# Patient Record
Sex: FEMALE | Race: WHITE | NOT HISPANIC OR LATINO | Employment: STUDENT | URBAN - METROPOLITAN AREA
[De-identification: names, ages, dates, MRNs, and addresses within clinical notes are randomized per-mention and may not be internally consistent; named-entity substitution may affect disease eponyms.]

---

## 2020-11-11 ENCOUNTER — OFFICE VISIT (OUTPATIENT)
Dept: OBGYN | Facility: CLINIC | Age: 20
End: 2020-11-11
Payer: COMMERCIAL

## 2020-11-11 VITALS
BODY MASS INDEX: 30.73 KG/M2 | WEIGHT: 180 LBS | DIASTOLIC BLOOD PRESSURE: 87 MMHG | HEART RATE: 103 BPM | HEIGHT: 64 IN | SYSTOLIC BLOOD PRESSURE: 131 MMHG

## 2020-11-11 DIAGNOSIS — J45.20 MILD INTERMITTENT ASTHMA WITHOUT COMPLICATION: ICD-10-CM

## 2020-11-11 DIAGNOSIS — Z90.89 HISTORY OF TONSILLECTOMY: ICD-10-CM

## 2020-11-11 DIAGNOSIS — F31.9 BIPOLAR DEPRESSION (H): ICD-10-CM

## 2020-11-11 DIAGNOSIS — Z30.430 ENCOUNTER FOR IUD INSERTION: Primary | ICD-10-CM

## 2020-11-11 DIAGNOSIS — E78.00 HYPERCHOLESTEROLEMIA: ICD-10-CM

## 2020-11-11 PROBLEM — J45.909 ASTHMA: Status: ACTIVE | Noted: 2020-11-11

## 2020-11-11 LAB
HCG UR QL: NEGATIVE
INTERNAL QC OK POCT: YES

## 2020-11-11 PROCEDURE — 99207 PR SERVICE NOT STAFFED W/SUPERV PROV: CPT | Performed by: OBSTETRICS & GYNECOLOGY

## 2020-11-11 PROCEDURE — G0463 HOSPITAL OUTPT CLINIC VISIT: HCPCS

## 2020-11-11 PROCEDURE — 81025 URINE PREGNANCY TEST: CPT | Performed by: STUDENT IN AN ORGANIZED HEALTH CARE EDUCATION/TRAINING PROGRAM

## 2020-11-11 PROCEDURE — 250N000011 HC RX IP 250 OP 636: Performed by: STUDENT IN AN ORGANIZED HEALTH CARE EDUCATION/TRAINING PROGRAM

## 2020-11-11 PROCEDURE — 90686 IIV4 VACC NO PRSV 0.5 ML IM: CPT

## 2020-11-11 PROCEDURE — 58300 INSERT INTRAUTERINE DEVICE: CPT | Mod: GC | Performed by: OBSTETRICS & GYNECOLOGY

## 2020-11-11 PROCEDURE — G0008 ADMIN INFLUENZA VIRUS VAC: HCPCS

## 2020-11-11 PROCEDURE — 250N000011 HC RX IP 250 OP 636

## 2020-11-11 PROCEDURE — 58300 INSERT INTRAUTERINE DEVICE: CPT | Performed by: STUDENT IN AN ORGANIZED HEALTH CARE EDUCATION/TRAINING PROGRAM

## 2020-11-11 RX ORDER — LURASIDONE HYDROCHLORIDE 40 MG/1
TABLET, FILM COATED ORAL
COMMUNITY
End: 2022-03-30

## 2020-11-11 RX ORDER — DEXTROAMPHETAMINE SACCHARATE, AMPHETAMINE ASPARTATE, DEXTROAMPHETAMINE SULFATE AND AMPHETAMINE SULFATE 7.5; 7.5; 7.5; 7.5 MG/1; MG/1; MG/1; MG/1
30 TABLET ORAL 2 TIMES DAILY
COMMUNITY
End: 2022-03-30

## 2020-11-11 RX ORDER — DIPHENHYDRAMINE HCL 25 MG
25 CAPSULE ORAL EVERY 6 HOURS PRN
COMMUNITY
End: 2021-11-03

## 2020-11-11 RX ORDER — DEXTROAMPHETAMINE SACCHARATE, AMPHETAMINE ASPARTATE, DEXTROAMPHETAMINE SULFATE AND AMPHETAMINE SULFATE 5; 5; 5; 5 MG/1; MG/1; MG/1; MG/1
20 TABLET ORAL 2 TIMES DAILY
COMMUNITY
End: 2022-03-30

## 2020-11-11 RX ORDER — CETIRIZINE HYDROCHLORIDE 10 MG/1
10 TABLET ORAL DAILY
COMMUNITY
End: 2021-11-03

## 2020-11-11 RX ORDER — LORATADINE 10 MG/1
10 TABLET ORAL DAILY
COMMUNITY
End: 2023-03-21

## 2020-11-11 RX ORDER — LANOLIN ALCOHOL/MO/W.PET/CERES
1 CREAM (GRAM) TOPICAL
COMMUNITY
End: 2023-03-21

## 2020-11-11 RX ORDER — LAMOTRIGINE 100 MG/1
100 TABLET ORAL DAILY
COMMUNITY

## 2020-11-11 RX ORDER — FLUTICASONE PROPIONATE 50 MCG
1 SPRAY, SUSPENSION (ML) NASAL DAILY
COMMUNITY
End: 2023-03-21

## 2020-11-11 RX ORDER — LEVONORGESTREL/ETHIN.ESTRADIOL 0.1-0.02MG
1 TABLET ORAL DAILY
COMMUNITY
End: 2021-11-03

## 2020-11-11 RX ORDER — MONTELUKAST SODIUM 10 MG/1
10 TABLET ORAL AT BEDTIME
COMMUNITY
End: 2023-03-21

## 2020-11-11 RX ORDER — CHLORAL HYDRATE 500 MG
2 CAPSULE ORAL DAILY
COMMUNITY
End: 2022-03-30

## 2020-11-11 RX ORDER — FAMOTIDINE 20 MG
TABLET ORAL
COMMUNITY
End: 2022-03-30

## 2020-11-11 RX ADMIN — LEVONORGESTREL 20 MCG: 52 INTRAUTERINE DEVICE INTRAUTERINE at 15:28

## 2020-11-11 SDOH — HEALTH STABILITY: MENTAL HEALTH: HOW OFTEN DO YOU HAVE 6 OR MORE DRINKS ON ONE OCCASION?: NEVER

## 2020-11-11 SDOH — HEALTH STABILITY: MENTAL HEALTH: HOW MANY STANDARD DRINKS CONTAINING ALCOHOL DO YOU HAVE ON A TYPICAL DAY?: NOT ASKED

## 2020-11-11 SDOH — HEALTH STABILITY: MENTAL HEALTH: HOW OFTEN DO YOU HAVE A DRINK CONTAINING ALCOHOL?: NEVER

## 2020-11-11 ASSESSMENT — MIFFLIN-ST. JEOR: SCORE: 1576.47

## 2020-11-11 ASSESSMENT — PAIN SCALES - GENERAL: PAINLEVEL: NO PAIN (0)

## 2020-11-11 NOTE — NURSING NOTE
Chief Complaint   Patient presents with     Establish Care     Fibroid consult   Rosemary Goodman LPN

## 2020-11-11 NOTE — PROGRESS NOTES
"[facility]  New Gynecology Visit      HPI:    Arabella Lawson is a 19 year old G0, here for evaluation of dysmenorrhea. She had pelvic US done in [place], per patient. She brings the report of the imaging, but not the actual images.     Patient reports always having heavy periods since menarche. She states she uses 4-5 overnight pads each day. Her periods are also associated with significant pain. She also reports irregular periods, sometimes q16d, sometimes q60d. Her mom and her sister also report having heavy periods. She denies family history of blood disorder or personal history of easy bruising or clots. She has tried three different OCPs with improvement in bleeding, but only mild improvement in pelvic pain.     Patient provides the written US report, which indicates that she has \"multiple follicles\" in bilateral ovaries. Uterus and ovaries are otherwise normal in size.       Patient would like to try a Mirena IUD. Discussed risks of the procedure including uterine perforation and failure of IUD.     GYN History  - LMP: No LMP recorded. (Menstrual status: Birth Control).  - Menses: Irregular, 16-60+ days. Uses 4-5 overnight pads  - Pap Smears: No pap smear history  - Contraception: has been on OCPs    OBHx  OB History   No obstetric history on file.       No past medical history on file.    No past surgical history on file.    Current Outpatient Medications   Medication     amphetamine-dextroamphetamine (ADDERALL) 20 MG tablet     amphetamine-dextroamphetamine (ADDERALL) 30 MG tablet     cetirizine (ZYRTEC) 10 MG tablet     diphenhydrAMINE (BENADRYL) 25 MG capsule     fish oil-omega-3 fatty acids 1000 MG capsule     fluticasone (FLONASE) 50 MCG/ACT nasal spray     lamoTRIgine (LAMICTAL) 100 MG tablet     levonorgestrel-ethinyl estradiol (AVIANE) 0.1-20 MG-MCG tablet     loratadine (CLARITIN) 10 MG tablet     lurasidone (LATUDA) 40 MG TABS tablet     melatonin 3 MG tablet     montelukast (SINGULAIR) 10 " "MG tablet     Vitamin D, Cholecalciferol, 25 MCG (1000 UT) CAPS     No current facility-administered medications for this visit.         No Known Allergies      Social History     Tobacco Use     Smoking status: Never Smoker     Smokeless tobacco: Never Used   Substance Use Topics     Alcohol use: Never     Frequency: Never     Binge frequency: Never     Drug use: Never         No family history on file.      ROS: 10-Point ROS negative except as noted in HPI    Physical Exam  /87   Pulse 103   Ht 1.626 m (5' 4\")   Wt 81.6 kg (180 lb)   Breastfeeding No   BMI 30.90 kg/m    Gen: Well-appearing, NAD  HEENT: Normocephalic, atraumatic  Neck: Thyroid is not enlarged, no appreciable masses palpated. Non-tender  CV:  RRR, no m/r/g auscultated  Pulm: CTAB, no w/r/r auscultated  Abd: Soft, non-tender, non-distended  Ext: No LE edema, extremities warm and well perfused    Pelvic:  Normal appearing external female genitalia. Normal hair distribution. Vagina is without lesions. discharge. Cervix no lesions, no cervical motion tenderness. Uterus is small, mobile, non-tender. No adnexal tenderness or masses    Assessment/Plan:  Arabella Lawson is a 19 year old G0 here for evaluation of dysmenorrhea.    Dysmenorrhea  - Inserted Mirena IUD  - Counseled to treat pain with periods with ibuprofen/tylenol  - Patient is able to check strings herself, no need for follow up  - Instructed to come back if she has problems or if the IUD does not help her symptoms      Return to clinic next year or as needed for problems.     Staffed with Dr. Quan.     El Mireles MD  Obstetrics, Gynecology, and Women's Health  PGY1  November 11, 2020 , 5:13 AM      The Patient was seen in [facility] by EL MIRELES.  I reviewed the history & exam. Assessment and plan were jointly made.  I was present for procedure.     Audelia Quan MD        Time Out - \"Pause for the Cause\"  Just before the procedure begins, " through verbal and active participation of team members, verify:    Initials   Patient Name    AJ   Patient Date of Birth AJ   Procedure to be performed  AJ   Site, laterality, level or multiples, noting patient position   AJ   Relevant images or diagnostics available/displayed   AJ   Implants, special equipment or special requirements        AJ     Consent:  Risks, benefits of treatment, and no treatment were discussed.  Patient's questions were elicited and answered. , Written consent signed and scanned into medical record. and Patient received and verbalized understanding of discharge instructions    PROCEDURE:  After informed consent was obtained from the patient, a speculum was placed in the vagina to visualized the cervix.  The cervix was then swabbed with a betadine prep and Xylocaine jelly applied.  Tenaculum was placed at the 12 o'clock position on the cervix and the uterus sounded to 7 cm.  The Mirena  IUD was then placed in the usual fashion under sterile technique.  Strings were clipped about 2 cm from the cervical os.  Tenaculum was removed and cervix was hemostatic. There were no complications. The patient tolerated the procedure well.  The patient should feel for the IUD strings after her next menses.  If unable to locate them, she should return to clinic for a speculum examination for confirmation that the IUD is in place. Bleeding pattern of this particular IUD was discussed with the patient. She is aware that the IUD will need to be removed in 5 years or PRN.  She is to return to clinic for her next annual or PRN.      El Ott MD

## 2021-02-24 ENCOUNTER — OFFICE VISIT (OUTPATIENT)
Dept: OPHTHALMOLOGY | Facility: CLINIC | Age: 21
End: 2021-02-24
Attending: OPHTHALMOLOGY
Payer: COMMERCIAL

## 2021-02-24 DIAGNOSIS — H04.123 DRY EYES, BILATERAL: ICD-10-CM

## 2021-02-24 DIAGNOSIS — H53.2 MONOCULAR DIPLOPIA OF BOTH EYES: Primary | ICD-10-CM

## 2021-02-24 DIAGNOSIS — H01.02B SQUAMOUS BLEPHARITIS OF UPPER AND LOWER EYELIDS OF BOTH EYES: ICD-10-CM

## 2021-02-24 DIAGNOSIS — H01.02A SQUAMOUS BLEPHARITIS OF UPPER AND LOWER EYELIDS OF BOTH EYES: ICD-10-CM

## 2021-02-24 PROCEDURE — G0463 HOSPITAL OUTPT CLINIC VISIT: HCPCS

## 2021-02-24 PROCEDURE — 99203 OFFICE O/P NEW LOW 30 MIN: CPT | Mod: GC | Performed by: OPHTHALMOLOGY

## 2021-02-24 ASSESSMENT — CONF VISUAL FIELD
OD_NORMAL: 1
OS_NORMAL: 1
METHOD: COUNTING FINGERS

## 2021-02-24 ASSESSMENT — EXTERNAL EXAM - RIGHT EYE: OD_EXAM: NORMAL

## 2021-02-24 ASSESSMENT — VISUAL ACUITY
OS_CC+: -1
OD_CC+: -1
OS_CC: 20/20
CORRECTION_TYPE: GLASSES
OD_CC: 20/20
METHOD: SNELLEN - LINEAR

## 2021-02-24 ASSESSMENT — CUP TO DISC RATIO
OS_RATIO: 0.3
OD_RATIO: 0.3

## 2021-02-24 ASSESSMENT — REFRACTION_WEARINGRX
OS_CYLINDER: +1.00
OD_CYLINDER: +0.50
OS_SPHERE: -1.00
SPECS_TYPE: SVL
OS_AXIS: 099
OD_AXIS: 070
OD_SPHERE: -0.75

## 2021-02-24 ASSESSMENT — TONOMETRY
IOP_METHOD: ICARE
OD_IOP_MMHG: 18
OS_IOP_MMHG: 18

## 2021-02-24 ASSESSMENT — SLIT LAMP EXAM - LIDS
COMMENTS: MGD
COMMENTS: MGD

## 2021-02-24 ASSESSMENT — EXTERNAL EXAM - LEFT EYE: OS_EXAM: NORMAL

## 2021-02-24 NOTE — PATIENT INSTRUCTIONS
Blepharitis    What is blepharitis?  Blepharitis is a common problem and although it does not result in blindness, it can contribute to red, irritated eyes. In the most common form of blepharitis, the eyelids are reddened and somewhat swollen and scaly appearing at the base of the lashes. As the scales become more coarse, the surface of the eye becomes irritated, forming crusts, which may cause the eyelids to stick together upon waking up in the morning. If the debris falls into your eye, you may feel like you have  something in your eye  or experience a gritty sensation.     Treating blepharitis  Ophthalmologists at the [facility] recommend controlling the problem through eyelid hygiene.   Eyelid hygiene is cleansing of the lid to promote a normal ocular surface. It is important to cleanse so that it does not develop into a more serious condition. Blepharitis cannot be cured, but with eyelid hygiene done on a daily basis, the symptoms may be controlled.     Cleansing the eye  Hot Compresses: perform 2 times daily, or as ordered by your doctor      A.  Soak clean washcloth in hot water  OR      B.  Commercially available hot pack  OR      C.  Dry rice in a clean sock (dress sock or other thin sock is best), microwave until hot (20-30 seconds)    - Place hot compress on closed eyelid for 3-5 minutes (make sure not too hot)   - Gently massage eyelids for 30 seconds    Eyelid Scrubs:   a. Make solution with   water +   baby shampoo OR  b. In shower: place baby shampoo on fingertips OR  c. Steri-Lid cleansing solution    -  Use solution of choice on fingertips to  brush  your eyelids (like brushing teeth) for 30 seconds  -  Rinse eyelids and eyelashes with clean water  - Wipe dry with clean, dry cloth (juanita cloth is best)      Additional care for blepharitis  Keep your hands and face clean. Be careful not to touch or rub your eyes with soiled handkerchiefs, dirty fingers, etc. Women should avoid the use of eye  makeup during the early stages of treatment. When cosmetic use is resumed, replace liquid products because your old products may be contaminated. Remove all eye makeup before bedtime.   Occasionally, medication may be prescribed to treat blepharitis, but only your doctor can decide if you are a candidate for this treatment.

## 2021-02-24 NOTE — NURSING NOTE
Chief Complaints and History of Present Illnesses   Patient presents with     Diplopia Evaluation     Chief Complaint(s) and History of Present Illness(es)     Diplopia Evaluation     Laterality: both eyes    Quality: vertical    Timing: when tired    Course: gradually worsening    Associated symptoms: blurred vision.  Negative for eye pain and headaches    Pain scale: 0/10              Comments     Pt complains of having constant vertical diplopia that does not go away when she covers an eye since last summer.  Feels it is continuing to get worse and is more noticeable when she is tired.  Pt wears glasses & soft CTL BE - wears CTL roughly 4-8 hours a day & does not sleep in them.  Denies any headaches or eye pain  Ocular meds: AT's PRN BE    Sun Sterling OT 2:09 PM February 24, 2021

## 2021-02-24 NOTE — PROGRESS NOTES
HPI:  Arabella Lawson is a 20 year old female here for evaluation of doubling and shadowing of her vision. She had noted difficulty focusing since last summer, but just noticed the doubling for the last couple of weeks. The images are vertical, and it is more of a shadowing that true double, and it is persistent in each eye if she closes the other eye. It is present whether she wears her glasses or her contact lenses. She notes intermittent dry feeling of both eyes. She has used artificial tears occasionally.     POH: Refractive error with glasses and soft contact lens wear. No history of eye surgery or trauma.  PMH: Asthma, HL, bipolar depression  FH: Glasses wear in most family members, no history of AMD or glc    Assessment & Plan     (H53.2) Monocular diplopia of both eyes  (primary encounter diagnosis)  (H04.123) Dry eyes, bilateral  (H01.02A,  H01.02B) Squamous blepharitis of upper and lower eyelids of both eyes  Comment: Dry eye/surface is most likely cause of her bilateral monocular diplopia/shadowing.   Plan: Reviewed possible etiologies of monocular diplopia.   Start warm compresses both eyes BID, Try ATs both eyes 3-4 x daily (Systane or Refresh)  Discussed that there are many options to treat eye dryness, so she will let me know if symptoms persist despite these measures, and we can reassess.      -----------------------------------------------------------------------------------    Patient disposition:   Return if symptoms worsen or fail to improve.    Kim Sagastume MD  Ophthalmology Resident, PGY-2    Teaching statement:  Complete documentation of historical and exam elements from today's encounter can be found in the full encounter summary report (not reduplicated in this progress note). I personally obtained the chief complaint(s) and history of present illness.  I confirmed and edited as necessary the review of systems, past medical/surgical history, family history, social history, and  examination findings as documented by others; and I examined the patient myself. I personally reviewed the relevant tests, images, and reports as documented above.     I formulated and edited as necessary the assessment and plan and discussed the findings and management plan with the patient and family.    Casandra Ward MD  Comprehensive Ophthalmology & Ocular Pathology  Department of Ophthalmology and Visual Neurosciences  [email or web address]  Pager [phone]

## 2021-03-07 ENCOUNTER — HEALTH MAINTENANCE LETTER (OUTPATIENT)
Age: 21
End: 2021-03-07

## 2021-04-21 ENCOUNTER — OFFICE VISIT (OUTPATIENT)
Dept: OPHTHALMOLOGY | Facility: CLINIC | Age: 21
End: 2021-04-21
Attending: OPHTHALMOLOGY
Payer: COMMERCIAL

## 2021-04-21 DIAGNOSIS — Z03.89 ENCOUNTER FOR OBSERVATION FOR OTHER SUSPECTED DISEASES AND CONDITIONS RULED OUT: ICD-10-CM

## 2021-04-21 DIAGNOSIS — H53.2 MONOCULAR DIPLOPIA OF BOTH EYES: Primary | ICD-10-CM

## 2021-04-21 DIAGNOSIS — H50.52 EXOPHORIA: ICD-10-CM

## 2021-04-21 DIAGNOSIS — H04.123 DRY EYES, BILATERAL: ICD-10-CM

## 2021-04-21 PROCEDURE — 92060 SENSORIMOTOR EXAMINATION: CPT

## 2021-04-21 PROCEDURE — 92060 SENSORIMOTOR EXAMINATION: CPT | Performed by: OPHTHALMOLOGY

## 2021-04-21 PROCEDURE — G0463 HOSPITAL OUTPT CLINIC VISIT: HCPCS | Mod: 25

## 2021-04-21 PROCEDURE — 99213 OFFICE O/P EST LOW 20 MIN: CPT | Mod: GC | Performed by: OPHTHALMOLOGY

## 2021-04-21 PROCEDURE — 92134 CPTRZ OPH DX IMG PST SGM RTA: CPT | Performed by: OPHTHALMOLOGY

## 2021-04-21 ASSESSMENT — EXTERNAL EXAM - RIGHT EYE: OD_EXAM: NORMAL

## 2021-04-21 ASSESSMENT — VISUAL ACUITY
METHOD: SNELLEN - LINEAR
OD_CC: 20/20
CORRECTION_TYPE: GLASSES
OS_CC: 20/20

## 2021-04-21 ASSESSMENT — REFRACTION_MANIFEST
OS_AXIS: 099
OS_SPHERE: -1.25
OS_CYLINDER: +1.00
OD_SPHERE: -0.50
OD_AXIS: 070
OD_CYLINDER: +0.50

## 2021-04-21 ASSESSMENT — REFRACTION_WEARINGRX
OS_AXIS: 099
SPECS_TYPE: SVL
OS_SPHERE: -1.00
OS_CYLINDER: +1.00
OD_SPHERE: -0.75
OD_CYLINDER: +0.50
OD_AXIS: 070

## 2021-04-21 ASSESSMENT — EXTERNAL EXAM - LEFT EYE: OS_EXAM: NORMAL

## 2021-04-21 ASSESSMENT — SLIT LAMP EXAM - LIDS
COMMENTS: MGD
COMMENTS: MGD

## 2021-04-21 NOTE — PROGRESS NOTES
HPI:  Arabella Lawson is a 20 year old female here for 2 month f/u eval of diplopia. Has monoocular diplopia of both eyes, but it is significantly improved with closing one eye. Diplopia is noted to be vertical with down gaze and horizontal with left and right gaze. There is some on primary gaze but more mild.    Pt first noticed blurry of vision over the summer so she got glasses, then noted double vision afterwards. It is has progressed but has been constant since. This is her first time wearing glasses or contacts. No hx of misalignment as a child. No hx of misalignment or amblyopia in the family. No hx of trauma.     Has been using WCs BID and ATs 3-4x a time without any change in sx.    Has a hx of chronic tension headaches. Had an MRI two years ago which was unremarkable. Does not feel like the frequency or severity of headaches have changed. NO nausea, vomiting, numbness, or weakness.     States that she has intermittent truncal ataxia for the past year since starting her psychiatric medication. Otherwise, no other neurologic symptoms. Pt otherwise healthy. Half-brother has a hx of brain cancer.    POH: Refractive error with glasses and soft contact lens wear. No history of eye surgery or trauma.  PMH: Asthma, HL, bipolar depression  FH: Glasses wear in most family members, no history of AMD or glc    On my alignment assessment:  Pt has a R hypertropia, pronounced on downgaze, and worse with left head tilt. Appears equivocal in right and left gaze. Close to ortho at primary position.     Assessment & Plan      (H53.2) Monocular diplopia of both eyes  (primary encounter diagnosis)  Comment: Bilateral monocular diplopia.  Not improved with lubrication.  Pentacam with mild regular astigmatism of both eyes, no evidence of KCN.  OCT MAC was unremarkable without ERM.  Small RH and exophoria, but has persistent bilateral monocular diplopia even with prism.    Still most likely cause of monocular diplopia in a  young healthy patient is ocular surface.    Plan:  Recommend she continue lubrication.   Refer to contact lens clinic for trial of RGP to see if it will improve her monoocular diplopia.  If still unresolved, can consider referral to neuro-ophthalmology for further evaluation of the small RH.    (H04.123) Dry eyes, bilateral  Comment: Improved ocular surface today  Plan: Cont warm compresses both eyes BID, ATs both eyes 3-4 x daily (Systane or Refresh)     -----------------------------------------------------------------------------------    Patient disposition:   Return for contact lens clinic next available, or sooner as needed.    Alondra Benedict MD  PGY-2 Resident Physician  Department of Ophthalmology      Teaching statement:  Complete documentation of historical and exam elements from today's encounter can be found in the full encounter summary report (not reduplicated in this progress note). I personally obtained the chief complaint(s) and history of present illness.  I confirmed and edited as necessary the review of systems, past medical/surgical history, family history, social history, and examination findings as documented by others; and I examined the patient myself. I personally reviewed the relevant tests, images, and reports as documented above.     I formulated and edited as necessary the assessment and plan and discussed the findings and management plan with the patient and family.    Casandra Ward MD  Comprehensive Ophthalmology & Ocular Pathology  Department of Ophthalmology and Visual Neurosciences  [email or web address]  Pager [phone]

## 2021-04-21 NOTE — NURSING NOTE
Chief Complaints and History of Present Illnesses   Patient presents with     Follow Up      Chief Complaint(s) and History of Present Illness(es)     Follow Up     Laterality: both eyes    Associated symptoms: double vision    Treatments tried: artificial tears    Response to treatment: no improvement              Comments     Follow up of monocular diplopia both eyes.  Diplopia the same each eye.  No change in vision.  Tried AT's and warm compress,no improvement in double vision.  Eye meds: AT's and warm compress   JAZMYN Zambrano 4/21/2021 9:49 AM

## 2021-04-29 ENCOUNTER — TELEPHONE (OUTPATIENT)
Dept: OPHTHALMOLOGY | Facility: CLINIC | Age: 21
End: 2021-04-29

## 2021-04-29 NOTE — TELEPHONE ENCOUNTER
Spoke with patient regarding a scheduling error and asked if patient could come in at noon for appointment same day. Patient was agreeable and rescheduled for noon same day. Sent appointment letter to Patient-Per Patient

## 2021-05-05 ENCOUNTER — OFFICE VISIT (OUTPATIENT)
Dept: OPHTHALMOLOGY | Facility: CLINIC | Age: 21
End: 2021-05-05
Payer: COMMERCIAL

## 2021-05-05 DIAGNOSIS — H53.2 MONOCULAR DIPLOPIA OF BOTH EYES: Primary | ICD-10-CM

## 2021-05-05 DIAGNOSIS — H04.123 DRY EYES, BILATERAL: ICD-10-CM

## 2021-05-05 DIAGNOSIS — H52.213 IRREGULAR ASTIGMATISM OF BOTH EYES: ICD-10-CM

## 2021-05-05 PROCEDURE — 99213 OFFICE O/P EST LOW 20 MIN: CPT | Performed by: OPTOMETRIST

## 2021-05-05 PROCEDURE — 92025 CPTRIZED CORNEAL TOPOGRAPHY: CPT | Performed by: OPTOMETRIST

## 2021-05-05 ASSESSMENT — KERATOMETRY
METHOD_AUTO_MANUAL: AUTOMATED
OD_K2POWER_DIOPTERS: 43.25
OS_K2POWER_DIOPTERS: 44.50
OD_AXISANGLE_DEGREES: 078
OD_AXISANGLE2_DEGREES: 168
OS_AXISANGLE2_DEGREES: 001
OS_K1POWER_DIOPTERS: 43.00
OS_AXISANGLE_DEGREES: 091
OD_K1POWER_DIOPTERS: 42.75

## 2021-05-05 ASSESSMENT — REFRACTION_CURRENTRX
OD_DIAMETER: 9.6
OS_SPHERE: -3.00
OS_BASECURVE: 7.80
OS_BRAND: OXYFLOW
OS_DIAMETER: 9.6
OD_BASECURVE: 7.90
OD_SPHERE: -3.00
OD_BRAND: OXYFLOW

## 2021-05-05 ASSESSMENT — CONF VISUAL FIELD
METHOD: COUNTING FINGERS
OS_NORMAL: 1
OD_NORMAL: 1

## 2021-05-05 ASSESSMENT — TONOMETRY
OD_IOP_MMHG: 18
OS_IOP_MMHG: 17
IOP_METHOD: ICARE

## 2021-05-05 ASSESSMENT — REFRACTION_MANIFEST
OD_SPHERE: -0.50
OD_CYLINDER: +0.50
OS_AXIS: 099
OD_SPHERE: -1.00
OD_AXIS: 070
OD_CYLINDER: +0.50
METHOD_AUTOREFRACTION: 1
OD_AXIS: 057
OS_CYLINDER: +1.50
OS_SPHERE: -1.00
OS_AXIS: 096
OS_SPHERE: -1.25
OS_CYLINDER: +1.00

## 2021-05-05 ASSESSMENT — VISUAL ACUITY
OS_CC: 20/20
VA_OR_OS_CURRENT_RX: 20/20-1
OD_CC: 20/20
CORRECTION_TYPE: GLASSES
METHOD: SNELLEN - LINEAR
VA_OR_OD_CURRENT_RX: 20/20

## 2021-05-05 ASSESSMENT — EXTERNAL EXAM - LEFT EYE: OS_EXAM: NORMAL

## 2021-05-05 ASSESSMENT — REFRACTION_WEARINGRX
OD_AXIS: 070
SPECS_TYPE: SVL
OS_AXIS: 099
OD_CYLINDER: +0.50
OS_CYLINDER: +1.00
OS_SPHERE: -1.00
OD_SPHERE: -0.75

## 2021-05-05 ASSESSMENT — SLIT LAMP EXAM - LIDS
COMMENTS: TR MGD
COMMENTS: TR MGD

## 2021-05-05 ASSESSMENT — EXTERNAL EXAM - RIGHT EYE: OD_EXAM: NORMAL

## 2021-05-05 NOTE — PROGRESS NOTES
History  HPI     Contact Lens Evaluation     In both eyes.  This started years ago.  Occurring constantly.  Since onset it is stable.  Associated symptoms include double vision.  Treatments tried include artificial tears.  Pain was noted as 0/10.              Comments     Arabella Lawson is being seen today at the request of Casandra Ward for contact lens for Monocular diplopia. Pt states continues to have double vision all the time, it doesn't go away with closing an eye and the double seems present in both eyes. Systane drops prn ou. Pt has worn soft contacts in the past.    Citlali Paz, COT COT 12:07 PM May 5, 2021     Complains that symptoms are constant and have been present for ~12 months. Got first pair of glasses following onset of symptoms, but they don't help with monocular diplopia. She wears them full-time.          Last edited by Thad Kramer, OD on 5/5/2021 12:26 PM. (History)          Assessment/Plan  (H53.2) Monocular diplopia of both eyes  (primary encounter diagnosis)  Comment: Constant monocular diplopia with and without habitual glasses correction.    Topography consistent with regular astigmatism both eyes    No improvement in symptoms with dry eye treatments  Plan:  Educated patient on clinical findings. The patient noted marked improvement with her symptoms while wearing a corneal gas permeable lens but complaints of blur upon blinking and her symptom of blur is more bothersome than her monocular diplopia. Discussed possible scleral lens fitting to help reduce lens movement while still correcting for monocular diplopia.     (H04.123) Dry eyes, bilateral  Comment: tr MGD, inferior SPK right eye, symptomatic.  Plan:  Recommended continue use of artificial tears and warm compresses. Future scleral lens fitting will also address symptoms of dry eye.     Return to clinic in 2-3 weeks for scleral lens fitting and I&R    Leticia Pascual, Optometry Student    I have reviewed and agree with the  above plan as written.    Teaching Statement:    Complete documentation of historical and exam elements from today's encounter can  be found in the full encounter summary report (not reduplicated in this progress  note). I personally obtained the chief complaint(s) and history of present illness. I  confirmed and edited as necessary the review of systems, past medical/surgical  history, family history, social history, and examination findings as documented by  others; and I examined the patient myself. I personally reviewed the relevant tests,  images, and reports as documented above. I formulated and edited as necessary the  assessment and plan and discussed the findings and management plan with the  patient and family.    Thad Kramer OD, FAAO

## 2021-05-05 NOTE — Clinical Note
Thanks for sending her. Her subjective symptoms seem to be minimal, but she does note an improvement with RGP lenses, but the lens movement is more bothersome than the diplopia. I am seeing her back for a scleral fit to reduce lens movement.  Thanks,  Julius

## 2021-05-05 NOTE — NURSING NOTE
Chief Complaints and History of Present Illnesses   Patient presents with     Contact Lens Evaluation     Chief Complaint(s) and History of Present Illness(es)     Contact Lens Evaluation     Laterality: both eyes    Onset: years ago    Frequency: constantly    Course: stable    Associated symptoms: double vision    Treatments tried: artificial tears    Pain scale: 0/10              Comments     Arabella YAO Sauerge is being seen today at the request of Casandra Ward for contact lens for Monocular diplopia. Pt states continues to have double vision all the time, it doesn't go away with closing an eye and the double seems present in both eyes. Systane drops prn ou. Pt has worn soft contacts in the past.    MARGOT Loera COT 12:07 PM May 5, 2021

## 2021-05-25 ENCOUNTER — OFFICE VISIT (OUTPATIENT)
Dept: OPHTHALMOLOGY | Facility: CLINIC | Age: 21
End: 2021-05-25
Payer: COMMERCIAL

## 2021-05-25 DIAGNOSIS — H53.2 MONOCULAR DIPLOPIA OF BOTH EYES: Primary | ICD-10-CM

## 2021-05-25 PROCEDURE — 99213 OFFICE O/P EST LOW 20 MIN: CPT | Performed by: OPTOMETRIST

## 2021-05-25 PROCEDURE — V2599 CONTACT LENS/ES OTHER TYPE: HCPCS | Performed by: OPTOMETRIST

## 2021-05-25 ASSESSMENT — VISUAL ACUITY
OS_CC: 20/20-2
OD_CC: 20/20
METHOD: SNELLEN - LINEAR

## 2021-05-25 ASSESSMENT — REFRACTION_CURRENTRX
OS_BRAND: ONEFIT
OS_DIAMETER: 14.9
OD_SPHERE: -2.00
OS_SPHERE: -2.50
OD_BRAND: ONEFIT
OD_DIAMETER: 14.9
OS_BASECURVE: 7.8
OD_BASECURVE: 7.9

## 2021-05-25 ASSESSMENT — REFRACTION_MANIFEST
OD_CYLINDER: SPHERE
OD_SPHERE: -0.25
OS_SPHERE: +0.25
OS_CYLINDER: SPHERE

## 2021-05-25 ASSESSMENT — EXTERNAL EXAM - LEFT EYE: OS_EXAM: NORMAL

## 2021-05-25 ASSESSMENT — EXTERNAL EXAM - RIGHT EYE: OD_EXAM: NORMAL

## 2021-05-25 ASSESSMENT — CONF VISUAL FIELD
METHOD: COUNTING FINGERS
OS_NORMAL: 1

## 2021-05-25 ASSESSMENT — TONOMETRY
IOP_METHOD: ICARE
OS_IOP_MMHG: 19
OD_IOP_MMHG: 19

## 2021-05-25 NOTE — PROGRESS NOTES
History  HPI     Patient is here for a contact lens exam. Patient has previous history of GP contact lens wear. Patient wore GPs to eliminate monocular diplopia. Excessive movement with GPs but clear single vision. Patient here today for scleral lens fitting. Currently wearing soft contact lenses and glasses. Experiencing diplopia through these corrections but clear vision. No changes in vision since previous visit.     Last edited by Edvin Hobson on 5/25/2021 12:55 PM. (History)          Assessment/Plan  (H53.2) Monocular diplopia of both eyes  (primary encounter diagnosis)  Comment: Significant improvement in monocular diplopia symptoms with scleral lenses, small over-refraction and small increase in vault needed  Plan:  Educated patient on clinical findings. Scleral lenses ordered for treatment of monocular diplopia (medically necessary). Lenses ordered based on fitting. Successful insertion/removal training at this visit. When lenses arrive, patient to return for scleral lens follow-up.    Contact Lens Billing  V-Code:  - CL, other; scleral lens     # of units: 2   Price per Unit: $250  Total Billed: $500    This patient requires contact lenses that are medically necessary for either improvement in vision over spectacles, support of the ocular surface, or other therapeutic benefit. These are not cosmetic contact lenses.     Encounter Diagnosis   Name Primary?     Monocular diplopia of both eyes Yes      Complete documentation of historical and exam elements from today's encounter can  be found in the full encounter summary report (not reduplicated in this progress  note). I personally obtained the chief complaint(s) and history of present illness. I  confirmed and edited as necessary the review of systems, past medical/surgical  history, family history, social history, and examination findings as documented by  others; and I examined the patient myself. I personally reviewed the relevant tests,  images,  and reports as documented above. I formulated and edited as necessary the  assessment and plan and discussed the findings and management plan with the  patient and family.    Thad Kramer OD, FAAO

## 2021-06-02 ENCOUNTER — TELEPHONE (OUTPATIENT)
Dept: OPHTHALMOLOGY | Facility: CLINIC | Age: 21
End: 2021-06-02

## 2021-06-02 NOTE — TELEPHONE ENCOUNTER
"Patient returned VM regarding scheduling a (40min) \"Scleral rebeca dispense visit, lenses received 6/2/PV\" with . Scheduled patient accordingly and sent appointment letter to confirmed address. Per Patient   "

## 2021-06-16 ENCOUNTER — OFFICE VISIT (OUTPATIENT)
Dept: OPHTHALMOLOGY | Facility: CLINIC | Age: 21
End: 2021-06-16
Payer: COMMERCIAL

## 2021-06-16 DIAGNOSIS — H53.2 MONOCULAR DIPLOPIA OF BOTH EYES: Primary | ICD-10-CM

## 2021-06-16 PROCEDURE — 99213 OFFICE O/P EST LOW 20 MIN: CPT | Performed by: OPTOMETRIST

## 2021-06-16 RX ORDER — SODIUM CHLORIDE FOR INHALATION 0.9 %
VIAL, NEBULIZER (ML) INHALATION
Qty: 5 ML | Refills: 11 | Status: SHIPPED | OUTPATIENT
Start: 2021-06-16 | End: 2022-10-21

## 2021-06-16 ASSESSMENT — TONOMETRY
OS_IOP_MMHG: 21
OD_IOP_MMHG: 21
IOP_METHOD: ICARE

## 2021-06-16 ASSESSMENT — REFRACTION_WEARINGRX
OD_SPHERE: -0.75
OS_SPHERE: -1.00
OD_AXIS: 070
OD_CYLINDER: +0.50
SPECS_TYPE: SVL
OS_AXIS: 099
OS_CYLINDER: +1.00

## 2021-06-16 ASSESSMENT — REFRACTION_CURRENTRX
OD_SPHERE: -1.25
OS_BRAND: ONEFIT
OD_BRAND: ONEFIT
OS_DIAMETER: 15.2
OS_BASECURVE: 8.0
OD_BASECURVE: 8.1
OS_SPHERE: -1.12
OD_DIAMETER: 15.2

## 2021-06-16 ASSESSMENT — CONF VISUAL FIELD
METHOD: COUNTING FINGERS
OD_NORMAL: 1
OS_NORMAL: 1

## 2021-06-16 ASSESSMENT — REFRACTION_MANIFEST
OS_SPHERE: PLANO
OD_SPHERE: PLANO
OS_CYLINDER: SPHERE
OD_CYLINDER: SPHERE

## 2021-06-16 ASSESSMENT — VISUAL ACUITY
OS_CC: 20/20
OS_CC+: -1
CORRECTION_TYPE: GLASSES
METHOD: SNELLEN - LINEAR
OD_CC: 20/20

## 2021-06-16 ASSESSMENT — EXTERNAL EXAM - RIGHT EYE: OD_EXAM: NORMAL

## 2021-06-16 ASSESSMENT — EXTERNAL EXAM - LEFT EYE: OS_EXAM: NORMAL

## 2021-06-16 NOTE — PROGRESS NOTES
History  HPI     Contact Lens Follow Up     In both eyes.  Charactertized as  distorted vision.  Occurring constantly.  Pain was noted as 0/10.              Comments     The patient presents for scleral lens dispense visit. No other complaints.          Last edited by Thad Kramer, OD on 6/16/2021  3:15 PM. (History)          Assessment/Plan  (H53.2) Monocular diplopia of both eyes  (primary encounter diagnosis)  Comment: Excellent fit and vision, patient states that diplopia is completely resolved, states that vision in contact lenses is far better than that in glasses  Plan: sodium chloride 0.9 % neb solution         Educated patient on clinical findings. Dispensed lenses for full-time wear (recommended slowly increasing daily wear time from 2- 4 hours up to 12). Prescribed non-preserved saline as filling solution. Return to clinic in 1 month for follow-up.     Return to clinic in 1 month for scleral lens follow-up.    Complete documentation of historical and exam elements from today's encounter can  be found in the full encounter summary report (not reduplicated in this progress  note). I personally obtained the chief complaint(s) and history of present illness. I  confirmed and edited as necessary the review of systems, past medical/surgical  history, family history, social history, and examination findings as documented by  others; and I examined the patient myself. I personally reviewed the relevant tests,  images, and reports as documented above. I formulated and edited as necessary the  assessment and plan and discussed the findings and management plan with the  patient and family.    Thad Kramer, OD, [facility]

## 2021-07-08 ENCOUNTER — OFFICE VISIT (OUTPATIENT)
Dept: OPHTHALMOLOGY | Facility: CLINIC | Age: 21
End: 2021-07-08
Payer: COMMERCIAL

## 2021-07-08 ENCOUNTER — MYC MEDICAL ADVICE (OUTPATIENT)
Dept: OPHTHALMOLOGY | Facility: CLINIC | Age: 21
End: 2021-07-08

## 2021-07-08 DIAGNOSIS — H53.2 MONOCULAR DIPLOPIA OF BOTH EYES: Primary | ICD-10-CM

## 2021-07-08 PROCEDURE — 99213 OFFICE O/P EST LOW 20 MIN: CPT | Performed by: OPTOMETRIST

## 2021-07-08 ASSESSMENT — VISUAL ACUITY
METHOD: SNELLEN - LINEAR
CORRECTION_TYPE: CONTACTS
OS_CC: 20/20
OS_CC+: -1
OD_CC: 20/20

## 2021-07-08 ASSESSMENT — CONF VISUAL FIELD
OD_NORMAL: 1
METHOD: COUNTING FINGERS
OS_NORMAL: 1

## 2021-07-08 ASSESSMENT — REFRACTION_CURRENTRX
OS_DIAMETER: 15.2
OD_SPHERE: -1.25
OD_DIAMETER: 15.2
OD_BRAND: ONEFIT
OS_BRAND: ONEFIT
OD_DIAMETER: 15.2
OD_BRAND: ONEFIT
OS_SPHERE: -1.62
OS_BASECURVE: 7.9
OD_SPHERE: -1.75
OS_DIAMETER: 15.2
OS_BRAND: ONEFIT
OS_SPHERE: -1.12
OD_BASECURVE: 8.1
OS_BASECURVE: 8.0
OD_BASECURVE: 8.0

## 2021-07-08 ASSESSMENT — EXTERNAL EXAM - LEFT EYE: OS_EXAM: NORMAL

## 2021-07-08 ASSESSMENT — TONOMETRY: IOP_UNABLETOASSESS: 1

## 2021-07-08 ASSESSMENT — REFRACTION_MANIFEST
OD_SPHERE: PLANO
OS_SPHERE: PLANO

## 2021-07-08 ASSESSMENT — EXTERNAL EXAM - RIGHT EYE: OD_EXAM: NORMAL

## 2021-07-08 NOTE — NURSING NOTE
Chief Complaints and History of Present Illnesses   Patient presents with     Contact Lens Follow Up     3 week f/u Scleral contact lens     Chief Complaint(s) and History of Present Illness(es)     Contact Lens Follow Up     Laterality: both eyes    Onset: 3 weeks ago    Associated symptoms: Negative for eye pain    Pain scale: 0/10    Comments: 3 week f/u Scleral contact lens              Comments     Pt notes she is seeing well, fit comfortably, no additional concerns.     Mirna Card COT July 8, 2021 12:54 PM

## 2021-07-20 ENCOUNTER — TELEPHONE (OUTPATIENT)
Dept: OPHTHALMOLOGY | Facility: CLINIC | Age: 21
End: 2021-07-20

## 2021-07-20 NOTE — TELEPHONE ENCOUNTER
Spoke with patient regarding Lens are being mailed out to [place] address and Dr. Kramer requested she follow-up after a month of wearing CTL. Schedule patient accordingly when patient returns to [place]. Patient will see appointment in [facility].-Per Patient

## 2021-07-20 NOTE — TELEPHONE ENCOUNTER
CTL received; sent to address provided by pt via [employer].  Will send message to Allegra to have pt f/u in a month after trying out CTL and receiving.

## 2021-08-23 ENCOUNTER — TELEPHONE (OUTPATIENT)
Dept: OPHTHALMOLOGY | Facility: CLINIC | Age: 21
End: 2021-08-23

## 2021-08-23 NOTE — TELEPHONE ENCOUNTER
Spoke with patient regarding sooner appointment available from the wait-list. Scheduled patient and will confirm with provider if appointment will run longer than 60 minutes.-Per Patient

## 2021-08-24 ENCOUNTER — TELEPHONE (OUTPATIENT)
Dept: OPHTHALMOLOGY | Facility: CLINIC | Age: 21
End: 2021-08-24

## 2021-08-24 NOTE — TELEPHONE ENCOUNTER
Spoke with patient confimring scheduled appointment with  should be no longer than 60 minutes. Patient will make sure she has enough time to get to class and will cancel one of the appointments after making sure of drive time-Per Patient

## 2021-09-02 ENCOUNTER — TELEPHONE (OUTPATIENT)
Dept: OPHTHALMOLOGY | Facility: CLINIC | Age: 21
End: 2021-09-02

## 2021-09-02 NOTE — TELEPHONE ENCOUNTER
Spoke with patient regarding confirming September appointment would work for patient after her looking into her drive time. Yes, 9/16/21 appointment works for patient. Also keeping November appointment in place if needed after being seen in September. Patient has my number if any scheduling conflicts arise.-Per Patient

## 2021-09-10 ENCOUNTER — TELEPHONE (OUTPATIENT)
Dept: OPHTHALMOLOGY | Facility: CLINIC | Age: 21
End: 2021-09-10

## 2021-09-10 NOTE — TELEPHONE ENCOUNTER
Spoke with patient s mother regarding cancelled appointment 9/16/2021 as Provider is out of Clinic. Patient's mother will let patient know of cancelled appointment.. -Per Patient s Mother

## 2021-09-10 NOTE — TELEPHONE ENCOUNTER
LVM for patient regarding appointment on 9/16/2021 needs rescheduled due to Provider is out of clinic. Provided [facility] number for rescheduling needs.

## 2021-09-13 ENCOUNTER — TELEPHONE (OUTPATIENT)
Dept: OPHTHALMOLOGY | Facility: CLINIC | Age: 21
End: 2021-09-13

## 2021-09-13 NOTE — TELEPHONE ENCOUNTER
Patient called and LVM that current November appointment will be fine for next appointment for a follow up with . Sent reminder letter to patient.-Per Patient voice message

## 2021-10-11 ENCOUNTER — HEALTH MAINTENANCE LETTER (OUTPATIENT)
Age: 21
End: 2021-10-11

## 2021-11-03 ENCOUNTER — OFFICE VISIT (OUTPATIENT)
Dept: OPHTHALMOLOGY | Facility: CLINIC | Age: 21
End: 2021-11-03
Payer: COMMERCIAL

## 2021-11-03 DIAGNOSIS — H53.2 MONOCULAR DIPLOPIA OF BOTH EYES: Primary | ICD-10-CM

## 2021-11-03 DIAGNOSIS — H52.213 IRREGULAR ASTIGMATISM OF BOTH EYES: ICD-10-CM

## 2021-11-03 PROCEDURE — 99213 OFFICE O/P EST LOW 20 MIN: CPT | Performed by: OPTOMETRIST

## 2021-11-03 ASSESSMENT — VISUAL ACUITY
OS_CC: 20/20
CORRECTION_TYPE: CONTACTS
METHOD: SNELLEN - LINEAR
OD_CC: 20/20

## 2021-11-03 ASSESSMENT — CONF VISUAL FIELD
OD_NORMAL: 1
METHOD: COUNTING FINGERS
OS_NORMAL: 1

## 2021-11-03 ASSESSMENT — TONOMETRY: IOP_UNABLETOASSESS: 1

## 2021-11-03 ASSESSMENT — EXTERNAL EXAM - RIGHT EYE: OD_EXAM: NORMAL

## 2021-11-03 ASSESSMENT — EXTERNAL EXAM - LEFT EYE: OS_EXAM: NORMAL

## 2021-11-03 NOTE — PROGRESS NOTES
History  HPI     Contact Lens Follow Up     In both eyes.  Associated symptoms include Negative for eye pain and dryness.  Pain was noted as 0/10.              Comments     Pt notes that there is occ fogging of vision in the CTL, she states that she can take out and clean it and it will improve and other times it will not, she feels that the OUTSIDE of the lens gets dry, but notes that the contacts are comfortable otherwise.     Mirna Card COT November 3, 2021 2:39 PM            Last edited by Cari Card on 11/3/2021  2:39 PM. (History)          Assessment/Plan  (H53.2) Monocular diplopia of both eyes  (primary encounter diagnosis)  Comment: Resolved with scleral lens wear, noting some midday fogging of lenses  Plan:  Educated patient on clinical findings. Central vault is appropriate and patient has good comfort in lenses. Consider increased limbal clearance with a future lens due to negative staining, though the patient denies discomfort. Regarding mid-day fogging, consider continuing with removal and reinsertion of lenses as needed. May also consider using celluvisc as a filling solution. Monitor at comprehensive eye exam in 6 months.    Addendum 11/15/21: The patient contacted the clinic noting that one of her lenses had broken. Given mild limbal staining, I reordered the lenses with larger diameter, in an effort to increase diameter. As I was away on paternity leave and unavailable for follow-up, [facility] is offering a one-time extension of the 90 policy, with a new warranty for both lenses starting today. New lenses with larger diameter ordered. Patient to be call for dispense when lenses arrive. Reference: [id number]    (H52.213) Irregular astigmatism of both eyes  Comment: See above    Return to clinic in 6 months for comprehensive eye exam with scleral lens refit.    Complete documentation of historical and exam elements from today's encounter can  be found in the full encounter summary report (not  reduplicated in this progress  note). I personally obtained the chief complaint(s) and history of present illness. I  confirmed and edited as necessary the review of systems, past medical/surgical  history, family history, social history, and examination findings as documented by  others; and I examined the patient myself. I personally reviewed the relevant tests,  images, and reports as documented above. I formulated and edited as necessary the  assessment and plan and discussed the findings and management plan with the  patient and family.    Thad Kramer OD, FAAO

## 2021-11-03 NOTE — NURSING NOTE
Chief Complaints and History of Present Illnesses   Patient presents with     Contact Lens Follow Up     Chief Complaint(s) and History of Present Illness(es)     Contact Lens Follow Up     Laterality: both eyes    Associated symptoms: Negative for eye pain and dryness    Pain scale: 0/10              Comments     Pt notes that there is occ fogging of vision in the CTL, she states that she can take out and clean it and it will improve and other times it will not, she feels that the OUTSIDE of the lens gets dry, but notes that the contacts are comfortable otherwise.     Mirna Card COT November 3, 2021 2:39 PM

## 2021-11-11 ENCOUNTER — MYC MEDICAL ADVICE (OUTPATIENT)
Dept: OPHTHALMOLOGY | Facility: CLINIC | Age: 21
End: 2021-11-11
Payer: COMMERCIAL

## 2021-11-15 ASSESSMENT — REFRACTION_CURRENTRX
OD_BASECURVE: 8.3
OD_BASECURVE: 8.0
OD_BRAND: ONEFIT
OS_BASECURVE: 8.2
OS_BRAND: ONEFIT
OS_DIAMETER: 15.5
OS_BASECURVE: 7.9
OD_BRAND: ONEFIT
OD_SPHERE: -0.25
OD_DIAMETER: 15.2
OS_SPHERE: -1.62
OS_DIAMETER: 15.2
OD_DIAMETER: 15.5
OS_SPHERE: PLANO
OD_SPHERE: -1.75
OS_BRAND: ONEFIT

## 2022-01-20 ENCOUNTER — OFFICE VISIT (OUTPATIENT)
Dept: OPHTHALMOLOGY | Facility: CLINIC | Age: 22
End: 2022-01-20
Payer: COMMERCIAL

## 2022-01-20 DIAGNOSIS — H53.2 MONOCULAR DIPLOPIA OF BOTH EYES: Primary | ICD-10-CM

## 2022-01-20 DIAGNOSIS — H52.213 IRREGULAR ASTIGMATISM OF BOTH EYES: ICD-10-CM

## 2022-01-20 PROCEDURE — 99213 OFFICE O/P EST LOW 20 MIN: CPT | Performed by: OPTOMETRIST

## 2022-01-20 ASSESSMENT — VISUAL ACUITY
OS_CC: 20/20
OD_CC: 20/20
METHOD: SNELLEN - LINEAR
VA_OR_OD_CURRENT_RX: 20/20
CORRECTION_TYPE: CONTACTS
VA_OR_OS_CURRENT_RX: 20/20

## 2022-01-20 ASSESSMENT — REFRACTION_CURRENTRX
OS_BASECURVE: 8.2
OS_SPHERE: PLANO
OD_DIAMETER: 15.5
OS_BRAND: ONEFIT
OD_SPHERE: -0.25
OD_BRAND: ONEFIT
OS_DIAMETER: 15.5
OD_BASECURVE: 8.3

## 2022-01-20 ASSESSMENT — EXTERNAL EXAM - LEFT EYE: OS_EXAM: NORMAL

## 2022-01-20 ASSESSMENT — CONF VISUAL FIELD
OS_NORMAL: 1
OD_NORMAL: 1

## 2022-01-20 ASSESSMENT — EXTERNAL EXAM - RIGHT EYE: OD_EXAM: NORMAL

## 2022-01-20 NOTE — PROGRESS NOTES
History  HPI     Contact Lens Follow Up     In both eyes.  Onset was gradual.  This started months ago.  Response to treatment was significant improvement.  Pain was noted as 0/10.              Comments     Patient here for scleral lens follow up each eye. Has been wearing them for 5 hrs today. Uses [employer] for filling and [employer] simplus for cleaning. No trouble with I&R. No irritation or redness with current lenses. Wears them every day for 8-12hrs. Some foggy vision after 6hrs of wearing and goes away after reinserting them, 50% of the time.             Last edited by Blair Connolly on 1/20/2022  2:42 PM. (History)          Assessment/Plan  (H53.2) Monocular diplopia of both eyes  (primary encounter diagnosis)  (H52.213) Irregular astigmatism of both eyes  Comment: Good vision and fit in new lenses, patient reporting good comfort and no clouding; low vault after several hours of wear but no corneal touch and no punctate limbal staining (irregular staining pattern may be secondary to removal)  Plan:  Educated patient on clinical findings. Given excellent comfort and vision, as well as resolution of diplopia and mid-day clouding, no change indicated in lenses at this time (low vault centrally but no corneal touch). Return to clinic in 6-8 weeks for scleral lens follow-up. If staining or discomfort are noted, consider switching design.    Complete documentation of historical and exam elements from today's encounter can  be found in the full encounter summary report (not reduplicated in this progress  note). I personally obtained the chief complaint(s) and history of present illness. I  confirmed and edited as necessary the review of systems, past medical/surgical  history, family history, social history, and examination findings as documented by  others; and I examined the patient myself. I personally reviewed the relevant tests,  images, and reports as documented above. I formulated and edited as necessary  the  assessment and plan and discussed the findings and management plan with the  patient and family.    Thad Kramer OD, FAAO

## 2022-01-25 ENCOUNTER — TELEPHONE (OUTPATIENT)
Dept: OPHTHALMOLOGY | Facility: CLINIC | Age: 22
End: 2022-01-25
Payer: COMMERCIAL

## 2022-01-25 NOTE — TELEPHONE ENCOUNTER
Spoke with patient regarding scheduling a follow-up on a Thursday in 6-8 weeks (early-March) with . Scheduled patient accordingly and sent appointment letter to confirmed address.-Per Patient

## 2022-03-27 ENCOUNTER — HEALTH MAINTENANCE LETTER (OUTPATIENT)
Age: 22
End: 2022-03-27

## 2022-03-30 ENCOUNTER — OFFICE VISIT (OUTPATIENT)
Dept: OPHTHALMOLOGY | Facility: CLINIC | Age: 22
End: 2022-03-30
Payer: COMMERCIAL

## 2022-03-30 DIAGNOSIS — H52.213 IRREGULAR ASTIGMATISM OF BOTH EYES: Primary | ICD-10-CM

## 2022-03-30 DIAGNOSIS — H53.2 MONOCULAR DIPLOPIA OF BOTH EYES: ICD-10-CM

## 2022-03-30 PROCEDURE — 99213 OFFICE O/P EST LOW 20 MIN: CPT | Performed by: OPTOMETRIST

## 2022-03-30 RX ORDER — DEXTROAMPHETAMINE SULFATE, DEXTROAMPHETAMINE SACCHARATE, AMPHETAMINE SULFATE AND AMPHETAMINE ASPARTATE 7.5; 7.5; 7.5; 7.5 MG/1; MG/1; MG/1; MG/1
CAPSULE, EXTENDED RELEASE ORAL
COMMUNITY
Start: 2021-10-25 | End: 2023-03-21

## 2022-03-30 RX ORDER — DEXTROAMPHETAMINE SULFATE, DEXTROAMPHETAMINE SACCHARATE, AMPHETAMINE SULFATE AND AMPHETAMINE ASPARTATE 5; 5; 5; 5 MG/1; MG/1; MG/1; MG/1
CAPSULE, EXTENDED RELEASE ORAL
COMMUNITY
Start: 2021-04-23 | End: 2023-03-21

## 2022-03-30 RX ORDER — LURASIDONE HYDROCHLORIDE 20 MG/1
TABLET, FILM COATED ORAL
COMMUNITY
Start: 2021-10-27

## 2022-03-30 ASSESSMENT — REFRACTION_CURRENTRX
OS_SPHERE: PLANO
OS_BASECURVE: 8.2
OS_DIAMETER: 15.5
OD_BRAND: ONEFIT
OD_SPHERE: -0.25
OD_BASECURVE: 8.3
OD_DIAMETER: 15.5
OS_BRAND: ONEFIT

## 2022-03-30 ASSESSMENT — VISUAL ACUITY
CORRECTION_TYPE: CONTACTS
METHOD: SNELLEN - LINEAR
OD_CC: 20/15
OS_CC: 20/20
OD_CC+: -2

## 2022-03-30 ASSESSMENT — EXTERNAL EXAM - LEFT EYE: OS_EXAM: NORMAL

## 2022-03-30 ASSESSMENT — CONF VISUAL FIELD
OD_NORMAL: 1
OS_NORMAL: 1

## 2022-03-30 ASSESSMENT — EXTERNAL EXAM - RIGHT EYE: OD_EXAM: NORMAL

## 2022-03-30 NOTE — PROGRESS NOTES
History  HPI     Contact Lens Follow Up     In both eyes.              Comments     Pt here today for CL follow up.  Pt states current lenses aren't as comfortable but pt would like to continue.  Still experiencing constant double vision but may be caused by a current medication.  Will being seeing primary about medication on Friday.    Ocular meds = none    Gueroeric JANG, MARLO 12:35 PM 03/30/2022             Last edited by Thad Kramer, OD on 3/30/2022 12:47 PM. (History)          Assessment/Plan  (H52.213) Irregular astigmatism of both eyes  (primary encounter diagnosis)  (H53.2) Monocular diplopia of both eyes  Comment: Low central vault, low limbal clearance, discomfort in current lenses, residual monocular diplopia left eye with lens, 13mm HVID  Plan:  Educated patient on clinical findings. Given the tight limbal fit in conjunction with large HVID, a larger lens is warranted. There is no larger lens available in this design, new lens design indicated. Referred to Dr. Correa for scleral lens refit.    Complete documentation of historical and exam elements from today's encounter can  be found in the full encounter summary report (not reduplicated in this progress  note). I personally obtained the chief complaint(s) and history of present illness. I  confirmed and edited as necessary the review of systems, past medical/surgical  history, family history, social history, and examination findings as documented by  others; and I examined the patient myself. I personally reviewed the relevant tests,  images, and reports as documented above. I formulated and edited as necessary the  assessment and plan and discussed the findings and management plan with the  patient and family.    Thad Kramer, OD, FAAO

## 2022-04-22 ENCOUNTER — OFFICE VISIT (OUTPATIENT)
Dept: OPHTHALMOLOGY | Facility: CLINIC | Age: 22
End: 2022-04-22
Payer: COMMERCIAL

## 2022-04-22 DIAGNOSIS — H52.213 IRREGULAR ASTIGMATISM OF BOTH EYES: ICD-10-CM

## 2022-04-22 DIAGNOSIS — H53.2 MONOCULAR DIPLOPIA OF BOTH EYES: Primary | ICD-10-CM

## 2022-04-22 PROCEDURE — 99213 OFFICE O/P EST LOW 20 MIN: CPT | Performed by: OPTOMETRIST

## 2022-04-22 ASSESSMENT — VISUAL ACUITY
OD_CC: 20/15
OS_CC: 20/20
METHOD: SNELLEN - LINEAR
OS_CC+: -2
OD_CC+: -1
CORRECTION_TYPE: CONTACTS

## 2022-04-22 ASSESSMENT — REFRACTION_CURRENTRX
OS_SPHERE: -2.00
OD_BRAND: ONEFIT
OD_DIAMETER: 17.0
OS_BRAND: ONEFIT
OS_SPHERE: PLANO
OS_BASECURVE: 8.2
OD_SPHERE: -0.25
OD_DIAMETER: 15.5
OS_DIAMETER: 15.5
OS_DIAMETER: 17.0
OD_BASECURVE: 8.3
OD_SPHERE: -2.00

## 2022-04-22 ASSESSMENT — SLIT LAMP EXAM - LIDS
COMMENTS: NORMAL
COMMENTS: NORMAL

## 2022-04-22 ASSESSMENT — CONF VISUAL FIELD
METHOD: COUNTING FINGERS
OD_NORMAL: 1
OS_NORMAL: 1

## 2022-04-22 ASSESSMENT — EXTERNAL EXAM - RIGHT EYE: OD_EXAM: NORMAL

## 2022-04-22 ASSESSMENT — EXTERNAL EXAM - LEFT EYE: OS_EXAM: NORMAL

## 2022-04-22 NOTE — NURSING NOTE
Chief Complaints and History of Present Illnesses   Patient presents with     New Eval For Contact Lens     Chief Complaint(s) and History of Present Illness(es)     New Eval For Contact Lens     Laterality: both eyes    Associated symptoms: Negative for glare, haloes, flashes and floaters    Treatments tried: no treatments    Pain scale: 0/10              Comments     Patient states discomfort in both lenses. Patient states she still sees double in left eye when lenses are in (sees double with both when lenses are out). Patient present for new scleral fitting today.     Ally Bahena, SUHAIL April 22, 2022 10:09 AM

## 2022-04-22 NOTE — PROGRESS NOTES
A/P  1.) Monocular diplopia with irregular astigmatism OU  -Previously in scleral lenses with mostly resolved diplopia. Still symptomatic for monocular diplopia when lenses are out  -Current lenses uncomfortable. Diam too small causing limbal touch 360. Large HVID, needs larger diam lenses  -Much improved comfort/fit with refit to 17.0 Zenlens design. No sphero-cyl OR, suggesting any future toric OR would be caused by lens flexure  -Continue [place] Simplus and NaCl vials to fill    Order new pair and mail direct. F/u 1-2 months for CL recheck, sooner prn    I have confirmed the patient's CC, HPI and reviewed Past Medical History, Past Surgical History, Social History, Family History, Problem List, Medication List and agree with Tech note.     Anai Correa OD FAAO FSLS

## 2022-07-08 ENCOUNTER — OFFICE VISIT (OUTPATIENT)
Dept: OPHTHALMOLOGY | Facility: CLINIC | Age: 22
End: 2022-07-08
Payer: COMMERCIAL

## 2022-07-08 DIAGNOSIS — H53.2 MONOCULAR DIPLOPIA OF BOTH EYES: Primary | ICD-10-CM

## 2022-07-08 DIAGNOSIS — H52.213 IRREGULAR ASTIGMATISM OF BOTH EYES: ICD-10-CM

## 2022-07-08 DIAGNOSIS — H04.123 DRY EYES, BILATERAL: ICD-10-CM

## 2022-07-08 PROCEDURE — 99213 OFFICE O/P EST LOW 20 MIN: CPT | Performed by: OPTOMETRIST

## 2022-07-08 ASSESSMENT — REFRACTION_CURRENTRX
OD_DIAMETER: 17.0
OS_DIAMETER: 17.0
OS_BRAND: ZENLENS PROLATE
OD_BRAND: ZENLENS PROLATE
OD_SPHERE: -2.75
OS_ADDL_SPECS: BOSTON XO BLUE HYDRAPEG
OD_ADDL_SPECS: BOSTON XO CLEAR HYDRAPEG
OS_SPHERE: -2.25

## 2022-07-08 ASSESSMENT — EXTERNAL EXAM - RIGHT EYE: OD_EXAM: NORMAL

## 2022-07-08 ASSESSMENT — CONF VISUAL FIELD
METHOD: COUNTING FINGERS
OS_NORMAL: 1
OD_NORMAL: 1

## 2022-07-08 ASSESSMENT — EXTERNAL EXAM - LEFT EYE: OS_EXAM: NORMAL

## 2022-07-08 ASSESSMENT — VISUAL ACUITY
OD_CC: 20/15
OS_CC+: -1
METHOD: SNELLEN - LINEAR
OD_CC+: -1
CORRECTION_TYPE: CONTACTS
OS_CC: 20/15

## 2022-07-08 ASSESSMENT — SLIT LAMP EXAM - LIDS
COMMENTS: NORMAL
COMMENTS: NORMAL

## 2022-07-08 NOTE — NURSING NOTE
Chief Complaints and History of Present Illnesses   Patient presents with     Follow Up     Chief Complaint(s) and History of Present Illness(es)     Follow Up     Laterality: both eyes              Comments     Patient states she has not been wearing lenses due to other issues (had wisdom teeth removed). Patient wore lenses 3x in the last week. Patient states comfort is good. Patient states vision is decent but has diplopia left eye. No other issues at this time.     Ally Bahena, SUHAIL July 8, 2022 10:06 AM

## 2022-07-08 NOTE — PROGRESS NOTES
A/P  1.) Monocular diplopia with irregular astigmatism OU  -Previously in scleral lenses with mostly resolved diplopia. Still symptomatic for monocular diplopia when lenses are out  -Much improved fit with larger diam lenses. Limbal stain resolved. Slightly tight fit vertical merid only  -Excellent vision OU, left eye does prefer small toric OR. Will include    Order new pair and mail direct. F/u prn with any lens concerns    I have confirmed the patient's CC, HPI and reviewed Past Medical History, Past Surgical History, Social History, Family History, Problem List, Medication List and agree with Tech note.     Anai Correa, RAMIREZ FAAO FSLS

## 2022-07-15 ENCOUNTER — OFFICE VISIT (OUTPATIENT)
Dept: FAMILY MEDICINE | Facility: CLINIC | Age: 22
End: 2022-07-15
Payer: COMMERCIAL

## 2022-07-15 VITALS
BODY MASS INDEX: 24.87 KG/M2 | DIASTOLIC BLOOD PRESSURE: 64 MMHG | TEMPERATURE: 99.2 F | SYSTOLIC BLOOD PRESSURE: 102 MMHG | WEIGHT: 144.9 LBS | OXYGEN SATURATION: 99 % | HEART RATE: 114 BPM

## 2022-07-15 DIAGNOSIS — T88.7XXA MEDICATION SIDE EFFECTS: ICD-10-CM

## 2022-07-15 DIAGNOSIS — R55 VASOVAGAL SYNCOPE: ICD-10-CM

## 2022-07-15 DIAGNOSIS — R00.0 TACHYCARDIA: Primary | ICD-10-CM

## 2022-07-15 DIAGNOSIS — N95.1 MENOPAUSAL SYNDROME (HOT FLASHES): ICD-10-CM

## 2022-07-15 PROBLEM — K08.409 WISDOM TEETH REMOVED: Status: ACTIVE | Noted: 2022-05-27

## 2022-07-15 LAB
ESTRADIOL SERPL-MCNC: 34 PG/ML
FSH SERPL IRP2-ACNC: 8.8 MIU/ML
LH SERPL-ACNC: 12 MIU/ML
MAGNESIUM SERPL-MCNC: 2.5 MG/DL (ref 1.7–2.3)
TSH SERPL DL<=0.005 MIU/L-ACNC: 1.98 UIU/ML (ref 0.3–4.2)

## 2022-07-15 PROCEDURE — 83001 ASSAY OF GONADOTROPIN (FSH): CPT | Performed by: STUDENT IN AN ORGANIZED HEALTH CARE EDUCATION/TRAINING PROGRAM

## 2022-07-15 PROCEDURE — 36415 COLL VENOUS BLD VENIPUNCTURE: CPT | Performed by: STUDENT IN AN ORGANIZED HEALTH CARE EDUCATION/TRAINING PROGRAM

## 2022-07-15 PROCEDURE — 83735 ASSAY OF MAGNESIUM: CPT | Performed by: STUDENT IN AN ORGANIZED HEALTH CARE EDUCATION/TRAINING PROGRAM

## 2022-07-15 PROCEDURE — 99204 OFFICE O/P NEW MOD 45 MIN: CPT | Performed by: STUDENT IN AN ORGANIZED HEALTH CARE EDUCATION/TRAINING PROGRAM

## 2022-07-15 PROCEDURE — 83002 ASSAY OF GONADOTROPIN (LH): CPT | Performed by: STUDENT IN AN ORGANIZED HEALTH CARE EDUCATION/TRAINING PROGRAM

## 2022-07-15 PROCEDURE — 82670 ASSAY OF TOTAL ESTRADIOL: CPT | Performed by: STUDENT IN AN ORGANIZED HEALTH CARE EDUCATION/TRAINING PROGRAM

## 2022-07-15 PROCEDURE — 84443 ASSAY THYROID STIM HORMONE: CPT | Performed by: STUDENT IN AN ORGANIZED HEALTH CARE EDUCATION/TRAINING PROGRAM

## 2022-07-15 RX ORDER — LAMOTRIGINE 50 MG/1
TABLET, ORALLY DISINTEGRATING ORAL
COMMUNITY
Start: 2022-04-09 | End: 2023-03-21

## 2022-07-15 ASSESSMENT — PAIN SCALES - GENERAL: PAINLEVEL: NO PAIN (0)

## 2022-07-15 NOTE — PROGRESS NOTES
Assessment and Plan     21-year-old female with past medical history of bipolar depression who presents due to a constellation of symptoms including sinus tachycardia shown to be sinus on an EKG done through psychiatry recently, dizziness, brain fog, fine motor issues, hot flashes.  Chronic in nature.  Her psychiatric nurse practitioner recommended she follow-up with primary care for provider about the symptoms.  Patient has had a CBC recently that was normal.  No concerning symptoms for anemia.  Thus we will not obtain a CBC but will obtain a TSH and magnesium further evaluate tachycardia.  Also recommended a Holter monitor and an echocardiogram.  If normal would consider referral to cardiology but more likely monitor.  For hot flash type symptoms I recommended checking some basic hormones as below.  Her dizziness I think is most consistent with vasovagal syncope by description.  Likely exacerbated by her sinus tachycardia.  We discussed drinking significant on a water and behavioral techniques to help control this but I did not recommend additional work-up.  I do think some of her symptoms could be related to medication side effects such as brain fog.  Also her Adderall is very likely contributing to her tachycardia.    1. Tachycardia  - Adult Cardiac Event Monitor; Future  - Echocardiogram Complete; Future  - Magnesium; Future  - TSH with free T4 reflex; Future    2. Menopausal syndrome (hot flashes)  - Estradiol; Future  - Luteinizing Hormone, Adult; Future  - Follicle stimulating hormone; Future    3. Vasovagal syncope    4. Medication side effects      Follow up: pending lab workup  Options for treatment and follow-up care were reviewed with the patient and/or guardian. Arabella Lawson and/or guardian engaged in the decision making process and verbalized understanding of the options discussed and agreed with the final plan.    Dr. Leo Villarreal         HPI:   Arabella Lawson is a 21 year old  female who  presents for:    Chief Complaint   Patient presents with     History of Present Illness     Dizziness, fast heart rate, brain fog, fine motor issues, hot flashes, etc. Thought was due to a medication or side effect of med but still having issues with a change in dosage of meds.      Patient tlels me she has ha d number of symptoms chronically and thought this was related to her meds. Her psychiatric nurse practitioner stated she should see a PCP. She has had dizziness, hot flashes, nausea, tachycardia, brain fog. She does feel a shaking of her hands with fine motor movements.     Tachycardia:  Has a Hx of heavy periods. Has an IUD now and hasn't had a period since. Had a hemoglobin at end of June and 13.5. EKG at that time showed sinus. She has occasional SOB, palpitations. No chest pain.     Dizziness:  Mostly with standing up. Will go away for a little bit. Feels like she may faint. Has tunneling type sensation. Never has actually fainted.      Answers for HPI/ROS submitted by the patient on 7/15/2022  How many servings of fruits and vegetables do you eat daily?: 0-1  On average, how many sweetened beverages do you drink each day (Examples: soda, juice, sweet tea, etc.  Do NOT count diet or artificially sweetened beverages)?: 0  How many minutes a day do you exercise enough to make your heart beat faster?: 30 to 60  How many days a week do you exercise enough to make your heart beat faster?: 5  How many days per week do you miss taking your medication?: 0  What is the reason for your visit today?: My psychiatric nurse practioner was concerned and recommended I see a doctor for symptoms I ve been experiencing  When did your symptoms begin?: More than a month  What are your symptoms?: Fast heart rate (increases significantly when standing), dizziness, brain fog, fatigue, blood pooling in limbs when standing, occassional shortness of breath not controlled by current asthma treatments, hot flashes accompanied by  worsening symptoms  How would you describe these symptoms?: Moderate  Are your symptoms:: Worsening  Have you had these symptoms before?: Yes  Have you tried or received treatment for these symptoms before?: No  Is there anything that makes you feel better?: Laying down sometimes works to lessen symptoms           PMHX:     Patient Active Problem List   Diagnosis     Bipolar depression (H)     Asthma     Hypercholesterolemia     History of tonsillectomy     [place] teeth removed       Current Outpatient Medications   Medication Sig Dispense Refill     ADDERALL XR 20 MG 24 hr capsule TAKE 1 CAPSULE BY MOUTH DAILY TAKE BETWEEN 12PM TO 1PM       fluticasone (FLONASE) 50 MCG/ACT nasal spray Spray 1 spray into both nostrils daily       lamoTRIgine (LAMICTAL) 50 MG TBDP ODT tab        LATUDA 20 MG TABS tablet TAKE 1 TABLET BY MOUTH DAILY WITH AT LEAST 350 CALORIES OF FOOD       loratadine (CLARITIN) 10 MG tablet Take 10 mg by mouth daily       melatonin 3 MG tablet Take 1 mg by mouth nightly as needed for sleep       montelukast (SINGULAIR) 10 MG tablet Take 10 mg by mouth At Bedtime       sodium chloride 0.9 % neb solution 100 count box of 5mL vials for use as directed with medically necessary contacts. No neb needed. 5 mL 11     ADDERALL XR 30 MG 24 hr capsule TAKE 1 CAPSULE BY MOUTH EVERY MORNING       lamoTRIgine (LAMICTAL) 100 MG tablet Take 100 mg by mouth daily         Social History     Tobacco Use     Smoking status: Never Smoker     Smokeless tobacco: Never Used   Substance Use Topics     Alcohol use: Never     Drug use: Never       Social History     Social History Narrative     Not on file       No Known Allergies    No results found for this or any previous visit (from the past 24 hour(s)).         Review of Systems:    ROS: 10 point ROS neg other than the symptoms noted above in the HPI.         Physical Exam:     Vitals:    07/15/22 1408   BP: 102/64   BP Location: Left arm   Patient Position: Sitting   Cuff  Size: Adult Regular   Pulse: 114   Temp: 99.2  F (37.3  C)   TempSrc: Temporal   SpO2: 99%   Weight: 65.7 kg (144 lb 14.4 oz)     Body mass index is 24.87 kg/m .    General appearance: Alert, cooperative, no distress, appears stated age  Head: Normocephalic, atraumatic, without obvious abnormality  Eyes: Pupils equal round, reactive.  Conjunctiva clear.  Nose: Nares normal, no drainage.  Throat: Lips, mucosa, tongue normal mucosa pink and moist  Neck: Supple, symmetric, trachea midline  Lungs: Clear to auscultation bilaterally, no wheezing or crackles present.  Respirations unlabored  Heart: tachycardia, normal rhythm, normal S1 and S2, no murmur, rub or gallop.  Abdomen: Soft, nontender, nondistended.  Bowel sounds active in all 4 quadrants.  No masses or organomegaly.  Extremities: Extremities normal, atraumatic.  No cyanosis or edema.  Skin: Skin color, texture, turgor normal no rashes or lesions on limited skin exam  Neurologic: CN II through XII intact, normal strength.

## 2022-07-18 NOTE — RESULT ENCOUNTER NOTE
Arianne  Your results from your recent clinic visit show:  Your blood tests look fine. Your magnesium is mildly high but I do not think this is contributing to your symptoms.   Your Hormones are all normal  Your thyroid is normal (TSH).  I will await your heart monitor and echocardiogram results and call you    If you have more questions please call the clinic at [phone] or send me a [employer] message    Dr. Leo Villarreal

## 2022-08-04 ENCOUNTER — ANCILLARY PROCEDURE (OUTPATIENT)
Dept: CARDIOLOGY | Facility: CLINIC | Age: 22
End: 2022-08-04
Attending: STUDENT IN AN ORGANIZED HEALTH CARE EDUCATION/TRAINING PROGRAM
Payer: COMMERCIAL

## 2022-08-04 DIAGNOSIS — R00.0 TACHYCARDIA: ICD-10-CM

## 2022-08-04 LAB — LVEF ECHO: NORMAL

## 2022-08-04 PROCEDURE — 93306 TTE W/DOPPLER COMPLETE: CPT | Performed by: INTERNAL MEDICINE

## 2022-08-04 PROCEDURE — 93270 REMOTE 30 DAY ECG REV/REPORT: CPT

## 2022-08-04 PROCEDURE — 93272 ECG/REVIEW INTERPRET ONLY: CPT | Performed by: INTERNAL MEDICINE

## 2022-08-04 NOTE — RESULT ENCOUNTER NOTE
I called and spoke with the patient about their recent clinic visit results. I answered any questions they had.      Dr. Leo Villarreal

## 2022-08-04 NOTE — PROGRESS NOTES
Per Leo Curtis, patient to have Event monitor placed.  Diagnosis: tachycardia  Monitor placed: Yes  Patient Instructed: Yes  Patient verbalized understanding: Yes  Holter # [id number]

## 2022-09-02 DIAGNOSIS — R00.2 PALPITATIONS: Primary | ICD-10-CM

## 2022-09-02 DIAGNOSIS — R42 LIGHT HEADEDNESS: ICD-10-CM

## 2022-09-24 ENCOUNTER — HEALTH MAINTENANCE LETTER (OUTPATIENT)
Age: 22
End: 2022-09-24

## 2022-10-17 DIAGNOSIS — H53.2 MONOCULAR DIPLOPIA OF BOTH EYES: ICD-10-CM

## 2022-10-19 NOTE — PROGRESS NOTES
Chief Complaint: Palpitations    HPI (October 21, 2022): Arabella Lawson is a 21 year old female with a past medical history of asthma and bipolar disorder who was referred to me for evaluation of palpitations by Dr. Leo Gonzalez.     Ms. Lawson began to experience symptoms of dizziness, brain fog, fine motor issues, and hot flashes.  This prompted her psychiatry team to obtain an EKG.  EKG was done in July 2020 and showed that she had sinus tachycardia.  She was subsequently referred to Dr. Gonzalez in July 2022. Dr. Gonzalez ordered a TTE and a 14-day Biotel monitor in August.  The transthoracic echocardiogram was devoid of any structural heart disease and her ambulatory cardiac monitor showed that she had 19 triggers, 17 of which correlated with sinus tachycardia.  Most of these events were in the 110 to 120 bpm range.  I personally reviewed the rhythm strips and did not see any evidence of a high risk supraventricular or ventricular tachycardia.    Today, Ms. Lawson notes that she has had palpitations on a near-daily basis since high school.  More recently, Ms. Lawson has seen that her tracked symptoms correlated with tachycardia, which she has seen on her Apple Watch.  She has noted that the tachycardia worsens when she stands and improves when she is lying down.  Her palpitations/tachycardia do seem to be associate with lightheadedness and dizziness/vertigo several times a day.  The episodes of lightheadedness are mostly a postural dizziness.  She has been able to avoid losing consciousness by bracing herself and rising slowly from a seated/lying position.  Some episodes can last for several minutes.  There are no specific triggers or relieving features.     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 She is able to consistently sleep approximately 7 hours per night with a fairly consistent bedtime and waking time.  She has  very minimal intake of caffeine/stimulants.      At the time of the consultation, Ms. Lawson notes an absence of chest pain at rest, dyspnea at rest , PND, orthopnea, or peripheral edema. She does not have a regular menstrual cycle due to having an IUD in situ.  She has not had any significant changes in her appetite or weight    A comprehensive ROS was done and the details are included above in the HPI.    Past Medical History:  Asthma  Bipolar disorder  Dyslipidemia as medication side effect  - No prior history of hypertension, T2DM, CAD, TIA/stroke, vascular aneurysms, PAD  Past Medical History:   Diagnosis Date     Asthma      Bipolar depression (H)        Past Surgical History:    Past Surgical History:   Procedure Laterality Date     TONSILLECTOMY         Drug History:  Home cardiac meds: None.  Current Outpatient Medications   Medication Sig Dispense Refill     ADDERALL XR 20 MG 24 hr capsule TAKE 1 CAPSULE BY MOUTH DAILY TAKE BETWEEN 12PM TO 1PM       fluticasone (FLONASE) 50 MCG/ACT nasal spray Spray 1 spray into both nostrils daily       lamoTRIgine (LAMICTAL) 100 MG tablet Take 100 mg by mouth daily       LATUDA 20 MG TABS tablet TAKE 1 TABLET BY MOUTH DAILY WITH AT LEAST 350 CALORIES OF FOOD       loratadine (CLARITIN) 10 MG tablet Take 10 mg by mouth daily       montelukast (SINGULAIR) 10 MG tablet Take 10 mg by mouth At Bedtime       sodium chloride 0.9 % neb solution 100 count box of 5mL vials for use as directed with medically necessary contacts. No neb needed. 5 mL 11     ADDERALL XR 30 MG 24 hr capsule TAKE 1 CAPSULE BY MOUTH EVERY MORNING       lamoTRIgine (LAMICTAL) 50 MG TBDP ODT tab        melatonin 3 MG tablet Take 1 mg by mouth nightly as needed for sleep           Family History:  - No family history of sudden cardiac death, premature CAD, valvular disorders  Family History   Problem Relation Age of Onset     Menstrual problems Mother      Menstrual problems Sister      Glaucoma No family hx  "of      Macular Degeneration No family hx of        Social History:    Social History     Tobacco Use     Smoking status: Never     Smokeless tobacco: Never   Substance Use Topics     Alcohol use: Never       No Known Allergies      Physical Examination:  Vitals: /77 (BP Location: Left arm, Patient Position: Sitting, Cuff Size: Adult Regular)   Pulse 107   Ht 1.66 m (5' 5.35\")   Wt 67.4 kg (148 lb 9.6 oz)   SpO2 100%   BMI 24.46 kg/m    BMI= Body mass index is 24.46 kg/m .    Orthostatics:  Lying supine for 3 minutes: 107/71, 88  Sitting (immediate): 111/77, 85  Standing (immediate): 114/81, 109  Standing (1 minute): 108/75, 100  Standing (3 minutes): 110/79, 103    GENERAL: Healthy, alert and no distress  Eyes: No xanthelasmas.  NECK: No palpable neck masses/goitre and no evidence of retrosternal goitre.   ENT: Moist mucosal membranes.  RESPIRATORY: No signs of resp distress. Lungs CTAB.  CARDIOVASCULAR:  No JVD, regular, normal S1+S2 without added sounds or murmurs.  ABDOMEN: ND, soft, non-tender, normal bowel sounds.  EXTREMITIES: Warm, well-perfused, no edema.   NEUROLOGY: GCS 15/15, no focal deficits.  VASC: No carotid bruits. Carotid, radial, brachial, popliteal, dorsalis pedis and posterior tibialis pulses are normal in volumes and symmetric bilaterally.   SKIN: No ecchymoses, no rashes.  PSYCH: Cooperative, pleasant affect.       Investigations:  I personally viewed and interpreted the following investigations:    Labs:  TSH 1.98 in July 2022    Recent Tests:  Echocardiogram (08/04/2022):  Tachycardia noted.     Left ventricular size, wall motion and function are normal. The ejection fraction is 60-65%.  Right ventricular function, chamber size, wall motion, and thickness are normal.  The inferior vena cava was normal in size with preserved respiratory  variability. No pericardial effusion is present.     There is no prior study for direct comparison.      Assessment and Plan:   Arabella Lawson " is a 21 year old female with a past medical history of dyslipidemia, ADHD, and bipolar disorder who presented to me for evaluation of tachycardia, palpitations, and lightheadedness in October 2022.    While Ms. Lawson's symptoms are on the spectrum of autonomic dysfunction, her orthostatics vitals do not clearly reflect an inappropriate sinus tachycardia or POTS.  I am not clear about the cause of this dysfunction, although her stimulant therapy may be contributing to her tachycardia.  Since reducing the stimulant therapy is not an option (this was tried and was unsuccessful in managing her mental health comorbidity), I did talk to her about the various cornerstones of management for these conditions, however, as I think they may still apply to her.  We talked about the importance of regular exercise, a regular sleep-wake cycle, and having a high fluid intake approximately 100 ounces daily.  I did also talk about compression hosiery/bike shorts, which she has already been trying.  I also suggested that the patient get CBC/BMP labs via her PCP to rule out common causes of her tachycardia.    Since she is doing all these things, I also talked her about the option of starting medical therapy for symptoms.  We talked about the side effects of metoprolol therapy (including fatigue, as it may counteract some of her stimulant therapy), and we opted to start with metoprolol tartrate 12.5 mg twice daily.    Problems:  Autonomic dysfunction    Plan:  - Lifestyle strategies, as above  - Start metoprolol tartrate 12.5 mg twice daily  - RTC 6 mos    A total of 60 minutes were spent on the day of the visit for chart review, care coordination, face-to-face consultation with the patient, and documentation.       Thomas Pizarro, [facility], [place]    Cardiovascular Division  Pager [phone]    CC  Patient Care Team:  Radha Galicia NP as PCP - General (Nurse Practitioner - Family)  El Ott MD as Resident (Student  in organized health care education/training program)  Thad Kramer OD as Assigned Surgical Provider  Thad Kramer OD as MD (Optometry)  Anai Correa OD (Optometry)  Leo Gonzalez MD as Assigned PCP

## 2022-10-21 ENCOUNTER — OFFICE VISIT (OUTPATIENT)
Dept: CARDIOLOGY | Facility: CLINIC | Age: 22
End: 2022-10-21
Attending: STUDENT IN AN ORGANIZED HEALTH CARE EDUCATION/TRAINING PROGRAM
Payer: COMMERCIAL

## 2022-10-21 VITALS
DIASTOLIC BLOOD PRESSURE: 77 MMHG | SYSTOLIC BLOOD PRESSURE: 117 MMHG | BODY MASS INDEX: 24.76 KG/M2 | HEART RATE: 107 BPM | HEIGHT: 65 IN | OXYGEN SATURATION: 100 % | WEIGHT: 148.6 LBS

## 2022-10-21 DIAGNOSIS — R42 LIGHT HEADEDNESS: ICD-10-CM

## 2022-10-21 DIAGNOSIS — R00.2 PALPITATIONS: ICD-10-CM

## 2022-10-21 PROCEDURE — G0463 HOSPITAL OUTPT CLINIC VISIT: HCPCS

## 2022-10-21 PROCEDURE — 99205 OFFICE O/P NEW HI 60 MIN: CPT | Performed by: STUDENT IN AN ORGANIZED HEALTH CARE EDUCATION/TRAINING PROGRAM

## 2022-10-21 RX ORDER — SODIUM CHLORIDE FOR INHALATION 0.9 %
VIAL, NEBULIZER (ML) INHALATION
Qty: 5 ML | Refills: 11 | Status: SHIPPED | OUTPATIENT
Start: 2022-10-21

## 2022-10-21 RX ORDER — METOPROLOL TARTRATE 25 MG/1
12.5 TABLET, FILM COATED ORAL 2 TIMES DAILY
Qty: 90 TABLET | Refills: 3 | Status: SHIPPED | OUTPATIENT
Start: 2022-10-21 | End: 2023-06-19

## 2022-10-21 ASSESSMENT — PAIN SCALES - GENERAL
PAINLEVEL: NO PAIN (0)

## 2022-10-21 NOTE — NURSING NOTE
Chief Complaint   Patient presents with     New Patient     New cards referral for sinus tachycardia with lightheadness  Munira from August 2022: episodes of sinus tach          Vitals were taken and medications were reconciled.    Mara Perkins  8:56 AM

## 2022-10-21 NOTE — PATIENT INSTRUCTIONS
You were seen at the [facility] clinic today.   You saw Dr. Thomas Pizarro, [facility], [place].    The following is a summary of your office visit today:    Metoprolol 12.5mg twice daily  Please try and pursue moderate-intensity exercise for 30 minutes at least five times weekly.  Please try and maintain a diet rich in fruit and vegetables and low in saturated fats and sugars.   Follow up with me in 6 months       If you have questions after this visit:   Send a StartSpanish message or contact the Cardiology Nurse Line  Office:  [phone] option #1, then #4 and ask for a Cardiology Nurse  Fax:  [phone]   Appointments:  [phone] option #1, then option #1     HOW TO CHECK YOUR BLOOD PRESSURE AT HOME:    Avoid eating, smoking, and exercising for at least 30 minutes before taking a reading.    Be sure you have taken your BP medication at least 2-3 hours before you check it.     Sit quietly for 10 minutes before a reading.     Sit in a chair with your feet flat on the floor. Rest your  arm on a table so that the arm cuff is at the same level as your heart.    Remain still during the reading.    Record your blood pressure and pulse in a log and bring to your next appointment.     If you have had any blood work, imaging or other testing completed we will be in touch within 1-2 weeks regarding the results. If you have any questions, concerns or need to schedule a follow up, please contact us at the numbers above. If you are needing refills please contact your pharmacy. For urgent after-hours care please call the the [facility] at [phone] (option #4) and ask to speak to the on-call Cardiologist.    It was a pleasure meeting with you today. Please let us know if there is anything else we can do for you so that we can be sure you are leaving completely satisfied with your care experience.     Your Cardiology Team at the [facility]

## 2022-10-21 NOTE — LETTER
10/21/2022      RE: Arabella Lawson  [address]  [address]  [place] [address]       Dear Colleague,    Thank you for the opportunity to participate in the care of your patient, Arabella Lawson, at the [facility] [place] at [facility]. Please see a copy of my visit note below.            Chief Complaint: Palpitations    HPI (October 21, 2022): Arabella Lawson is a 21 year old female with a past medical history of asthma and bipolar disorder who was referred to me for evaluation of palpitations by Dr. Leo Gonzalez.     Ms. Lawson began to experience symptoms of dizziness, brain fog, fine motor issues, and hot flashes.  This prompted her psychiatry team to obtain an EKG.  EKG was done in July 2020 and showed that she had sinus tachycardia.  She was subsequently referred to Dr. Gonzalez in July 2022. Dr. Gonzalez ordered a TTE and a 14-day Biotel monitor in August.  The transthoracic echocardiogram was devoid of any structural heart disease and her ambulatory cardiac monitor showed that she had 19 triggers, 17 of which correlated with sinus tachycardia.  Most of these events were in the 110 to 120 bpm range.  I personally reviewed the rhythm strips and did not see any evidence of a high risk supraventricular or ventricular tachycardia.    Today, Ms. Lawson notes that she has had palpitations on a near-daily basis since high school.  More recently, Ms. Lawson has seen that her tracked symptoms correlated with tachycardia, which she has seen on her Apple Watch.  She has noted that the tachycardia worsens when she stands and improves when she is lying down.  Her palpitations/tachycardia do seem to be associate with lightheadedness and dizziness/vertigo several times a day.  The episodes of lightheadedness are mostly a postural dizziness.  She has been able to avoid losing consciousness by bracing herself and rising slowly from a seated/lying  position.  Some episodes can last for several minutes.  There are no specific triggers or relieving features.     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 She is able to consistently sleep approximately 7 hours per night with a fairly consistent bedtime and waking time.  She has very minimal intake of caffeine/stimulants.      At the time of the consultation, Ms. Lawson notes an absence of chest pain at rest, dyspnea at rest , PND, orthopnea, or peripheral edema. She does not have a regular menstrual cycle due to having an IUD in situ.  She has not had any significant changes in her appetite or weight    A comprehensive ROS was done and the details are included above in the HPI.    Past Medical History:  Asthma  Bipolar disorder  Dyslipidemia as medication side effect  - No prior history of hypertension, T2DM, CAD, TIA/stroke, vascular aneurysms, PAD  Past Medical History:   Diagnosis Date     Asthma      Bipolar depression (H)        Past Surgical History:    Past Surgical History:   Procedure Laterality Date     TONSILLECTOMY         Drug History:  Home cardiac meds: None.  Current Outpatient Medications   Medication Sig Dispense Refill     ADDERALL XR 20 MG 24 hr capsule TAKE 1 CAPSULE BY MOUTH DAILY TAKE BETWEEN 12PM TO 1PM       fluticasone (FLONASE) 50 MCG/ACT nasal spray Spray 1 spray into both nostrils daily       lamoTRIgine (LAMICTAL) 100 MG tablet Take 100 mg by mouth daily       LATUDA 20 MG TABS tablet TAKE 1 TABLET BY MOUTH DAILY WITH AT LEAST 350 CALORIES OF FOOD       loratadine (CLARITIN) 10 MG tablet Take 10 mg by mouth daily       montelukast (SINGULAIR) 10 MG tablet Take 10 mg by mouth At Bedtime       sodium chloride 0.9 % neb solution 100 count box of 5mL vials for use as directed with medically necessary contacts. No neb needed. 5 mL 11     ADDERALL XR 30 MG 24 hr capsule TAKE 1 CAPSULE BY MOUTH EVERY MORNING   "     lamoTRIgine (LAMICTAL) 50 MG TBDP ODT tab        melatonin 3 MG tablet Take 1 mg by mouth nightly as needed for sleep           Family History:  - No family history of sudden cardiac death, premature CAD, valvular disorders  Family History   Problem Relation Age of Onset     Menstrual problems Mother      Menstrual problems Sister      Glaucoma No family hx of      Macular Degeneration No family hx of        Social History:    Social History     Tobacco Use     Smoking status: Never     Smokeless tobacco: Never   Substance Use Topics     Alcohol use: Never       No Known Allergies      Physical Examination:  Vitals: /77 (BP Location: Left arm, Patient Position: Sitting, Cuff Size: Adult Regular)   Pulse 107   Ht 1.66 m (5' 5.35\")   Wt 67.4 kg (148 lb 9.6 oz)   SpO2 100%   BMI 24.46 kg/m    BMI= Body mass index is 24.46 kg/m .    Orthostatics:  Lying supine for 3 minutes: 107/71, 88  Sitting (immediate): 111/77, 85  Standing (immediate): 114/81, 109  Standing (1 minute): 108/75, 100  Standing (3 minutes): 110/79, 103    GENERAL: Healthy, alert and no distress  Eyes: No xanthelasmas.  NECK: No palpable neck masses/goitre and no evidence of retrosternal goitre.   ENT: Moist mucosal membranes.  RESPIRATORY: No signs of resp distress. Lungs CTAB.  CARDIOVASCULAR:  No JVD, regular, normal S1+S2 without added sounds or murmurs.  ABDOMEN: ND, soft, non-tender, normal bowel sounds.  EXTREMITIES: Warm, well-perfused, no edema.   NEUROLOGY: GCS 15/15, no focal deficits.  VASC: No carotid bruits. Carotid, radial, brachial, popliteal, dorsalis pedis and posterior tibialis pulses are normal in volumes and symmetric bilaterally.   SKIN: No ecchymoses, no rashes.  PSYCH: Cooperative, pleasant affect.       Investigations:  I personally viewed and interpreted the following investigations:    Labs:  TSH 1.98 in July 2022    Recent Tests:  Echocardiogram (08/04/2022):  Tachycardia noted.     Left ventricular size, wall " motion and function are normal. The ejection fraction is 60-65%.  Right ventricular function, chamber size, wall motion, and thickness are normal.  The inferior vena cava was normal in size with preserved respiratory  variability. No pericardial effusion is present.     There is no prior study for direct comparison.      Assessment and Plan:   Arabella Lawson is a 21 year old female with a past medical history of dyslipidemia, ADHD, and bipolar disorder who presented to me for evaluation of tachycardia, palpitations, and lightheadedness in October 2022.    While Ms. Lawson's symptoms are on the spectrum of autonomic dysfunction, her orthostatics vitals do not clearly reflect an inappropriate sinus tachycardia or POTS.  I am not clear about the cause of this dysfunction, although her stimulant therapy may be contributing to her tachycardia.  Since reducing the stimulant therapy is not an option (this was tried and was unsuccessful in managing her mental health comorbidity), I did talk to her about the various cornerstones of management for these conditions, however, as I think they may still apply to her.  We talked about the importance of regular exercise, a regular sleep-wake cycle, and having a high fluid intake approximately 100 ounces daily.  I did also talk about compression hosiery/bike shorts, which she has already been trying.  I also suggested that the patient get CBC/BMP labs via her PCP to rule out common causes of her tachycardia.    Since she is doing all these things, I also talked her about the option of starting medical therapy for symptoms.  We talked about the side effects of metoprolol therapy (including fatigue, as it may counteract some of her stimulant therapy), and we opted to start with metoprolol tartrate 12.5 mg twice daily.    Problems:  Autonomic dysfunction    Plan:  - Lifestyle strategies, as above  - Start metoprolol tartrate 12.5 mg twice daily  - RTC 6 mos    A total of 60 minutes  were spent on the day of the visit for chart review, care coordination, face-to-face consultation with the patient, and documentation.       Thomas Pizarro, [facility], [place]    Cardiovascular Division  Pager [phone]    CC  Patient Care Team:  Radha Galicia NP as PCP - General (Nurse Practitioner - Family)  El Ott MD as Resident (Student in organized health care education/training program)  Thad Kramer OD as Assigned Surgical Provider  Thad Kramer OD as MD (Optometry)  Anai Correa OD (Optometry)  Leo Gonzalez MD as Assigned PCP

## 2022-10-25 DIAGNOSIS — R00.0 TACHYCARDIA: Primary | ICD-10-CM

## 2022-10-25 NOTE — PROGRESS NOTES
Obtain BMP to eval electrolytes     Date: 10/25/2022    Time of Call: 8:30 AM     Diagnosis:  tachycardia     vorb: Ordering provider: Dr. Pizarro  Order: bmp      Order received by: Birgit Bentley RN      Follow-up/additional notes: follow-up after resulted

## 2022-10-27 ENCOUNTER — LAB (OUTPATIENT)
Dept: LAB | Facility: CLINIC | Age: 22
End: 2022-10-27
Payer: COMMERCIAL

## 2022-10-27 DIAGNOSIS — R00.0 TACHYCARDIA: ICD-10-CM

## 2022-10-27 LAB
ANION GAP SERPL CALCULATED.3IONS-SCNC: 8 MMOL/L (ref 7–15)
BUN SERPL-MCNC: 7.3 MG/DL (ref 6–20)
CALCIUM SERPL-MCNC: 9.3 MG/DL (ref 8.6–10)
CHLORIDE SERPL-SCNC: 105 MMOL/L (ref 98–107)
CREAT SERPL-MCNC: 0.65 MG/DL (ref 0.51–0.95)
DEPRECATED HCO3 PLAS-SCNC: 27 MMOL/L (ref 22–29)
GFR SERPL CREATININE-BSD FRML MDRD: >90 ML/MIN/1.73M2
GLUCOSE SERPL-MCNC: 81 MG/DL (ref 70–99)
POTASSIUM SERPL-SCNC: 3.5 MMOL/L (ref 3.4–5.3)
SODIUM SERPL-SCNC: 140 MMOL/L (ref 136–145)

## 2022-10-27 PROCEDURE — 36415 COLL VENOUS BLD VENIPUNCTURE: CPT | Performed by: PATHOLOGY

## 2022-10-27 PROCEDURE — 80048 BASIC METABOLIC PNL TOTAL CA: CPT | Performed by: PATHOLOGY

## 2023-01-11 ENCOUNTER — TELEPHONE (OUTPATIENT)
Dept: OPHTHALMOLOGY | Facility: CLINIC | Age: 23
End: 2023-01-11

## 2023-01-12 NOTE — TELEPHONE ENCOUNTER
I called to discuss the solution backorder with the patient.  I recommended she purchase saline vials (Addipak) on [employer], as there is no good alternative available through the pharmacy.

## 2023-03-21 ENCOUNTER — OFFICE VISIT (OUTPATIENT)
Dept: OBGYN | Facility: CLINIC | Age: 23
End: 2023-03-21
Payer: COMMERCIAL

## 2023-03-21 VITALS
HEIGHT: 65 IN | DIASTOLIC BLOOD PRESSURE: 73 MMHG | HEART RATE: 86 BPM | WEIGHT: 150 LBS | BODY MASS INDEX: 24.99 KG/M2 | SYSTOLIC BLOOD PRESSURE: 114 MMHG

## 2023-03-21 DIAGNOSIS — Z00.00 HEALTHCARE MAINTENANCE: Primary | ICD-10-CM

## 2023-03-21 PROCEDURE — G0463 HOSPITAL OUTPT CLINIC VISIT: HCPCS | Mod: 25 | Performed by: STUDENT IN AN ORGANIZED HEALTH CARE EDUCATION/TRAINING PROGRAM

## 2023-03-21 PROCEDURE — 99213 OFFICE O/P EST LOW 20 MIN: CPT | Mod: GE | Performed by: OBSTETRICS & GYNECOLOGY

## 2023-03-21 PROCEDURE — G0145 SCR C/V CYTO,THINLAYER,RESCR: HCPCS | Performed by: STUDENT IN AN ORGANIZED HEALTH CARE EDUCATION/TRAINING PROGRAM

## 2023-03-21 RX ORDER — DEXTROAMPHETAMINE SACCHARATE, AMPHETAMINE ASPARTATE MONOHYDRATE, DEXTROAMPHETAMINE SULFATE AND AMPHETAMINE SULFATE 7.5; 7.5; 7.5; 7.5 MG/1; MG/1; MG/1; MG/1
CAPSULE, EXTENDED RELEASE ORAL
COMMUNITY
Start: 2022-10-01 | End: 2024-08-08

## 2023-03-21 NOTE — PATIENT INSTRUCTIONS
Thank you for trusting us with your care!     If you need to contact us for questions about:  Symptoms, Scheduling & Medical Questions; Non-urgent (2-3 day response) Collette message, Urgent (needing response today) [phone] (if after 3:30pm next day response)   Prescriptions: Please call your Pharmacy   Billing: [facility] [phone] or [facility]:[phone]     General

## 2023-03-21 NOTE — PROGRESS NOTES
"[facility]  Gynecology Visit      HPI:    Arabella Lawson is a 22 year old G0 here for first Pap test.    Patient has IUD in place for past 3 years. She states about six months ago she began to have irregular bleeding and cramping like she did with her periods prior to having the IUD in place. She has been taking ibuprofen with periods with intermittent success. Prior to her IUD, she had tried several types of OCPs. IUD was working very well prior to six months ago. She desires to keep IUD in place for now.     Patient is not sexually active, has never been sexually active. Declines STD testing today. No other concerns at this time.    GYN History  - LMP: No LMP recorded. (Menstrual status: Birth Control).    OBHx  OB History    Para Term  AB Living   0 0 0 0 0 0   SAB IAB Ectopic Multiple Live Births   0 0 0 0 0       Past Medical History:   Diagnosis Date     Asthma      Bipolar depression (H)        Past Surgical History:   Procedure Laterality Date     TONSILLECTOMY         Current Outpatient Medications   Medication     amphetamine-dextroamphetamine (ADDERALL XR) 30 MG 24 hr capsule     lamoTRIgine (LAMICTAL) 100 MG tablet     LATUDA 20 MG TABS tablet     metoprolol tartrate (LOPRESSOR) 25 MG tablet     sodium chloride 0.9 % neb solution     No current facility-administered medications for this visit.        No Known Allergies      Social History     Tobacco Use     Smoking status: Never     Smokeless tobacco: Never   Substance Use Topics     Alcohol use: Never     Drug use: Never         Family History   Problem Relation Age of Onset     Menstrual problems Mother      Menstrual problems Sister      Glaucoma No family hx of      Macular Degeneration No family hx of          ROS: 10-Point ROS negative except as noted in HPI    Physical Exam  /73   Pulse 86   Ht 1.66 m (5' 5.35\")   Wt 68 kg (150 lb)   BMI 24.69 kg/m    Gen: Well-appearing, NAD  HEENT: Normocephalic, atraumatic  CV: "  Well perfused  Pulm: On room air, no increased work of breathing  Abd: Soft, non-tender, non-distended  Ext: No LE edema, extremities warm    Pelvic:  Normal appearing external female genitalia. Normal hair distribution. Vagina is without lesions. Physiologic discharge. Cervix nulliparous, no lesions, no cervical motion tenderness, IUD strings in place. Uterus is small, mobile, non-tender. No adnexal tenderness or masses    Assessment/Plan:  Arabella Lawson is a 22 year old G0 female here for Pap test.    #Healthcare maintenance  - Pap collected, results via [employer]  - Will keep IUD in place for now, but will re-evaluate in six months    Staffed with Dr. Avelina Ott MD  Obstetrics, Gynecology, and Women's Health  PGY3  6:42 PM 03/21/2023    The Patient was seen in [facility] by JUAN RAMON OTT.  I reviewed the history & exam. Assessment and plan were jointly made.    Audelia Quan MD

## 2023-03-24 LAB
BKR LAB AP GYN ADEQUACY: NORMAL
BKR LAB AP GYN INTERPRETATION: NORMAL
BKR LAB AP HPV REFLEX: NO
BKR LAB AP PREVIOUS ABNORMAL: NORMAL
PATH REPORT.COMMENTS IMP SPEC: NORMAL
PATH REPORT.COMMENTS IMP SPEC: NORMAL
PATH REPORT.RELEVANT HX SPEC: NORMAL

## 2023-04-18 NOTE — PROGRESS NOTES
Chief Complaint: Palpitations    HPI (October 21, 2022): Arabella Lawson is a 21 year old female with a past medical history of asthma and bipolar disorder who was referred to me for evaluation of palpitations by Dr. Leo Gonzalez.     Ms. Lawson began to experience symptoms of dizziness, brain fog, fine motor issues, and hot flashes.  This prompted her psychiatry team to obtain an EKG.  EKG was done in July 2020 and showed that she had sinus tachycardia.  She was subsequently referred to Dr. Gonzalez in July 2022. Dr. Gonzalez ordered a TTE and a 14-day Biotel monitor in August.  The transthoracic echocardiogram was devoid of any structural heart disease and her ambulatory cardiac monitor showed that she had 19 triggers, 17 of which correlated with sinus tachycardia.  Most of these events were in the 110 to 120 bpm range.  I personally reviewed the rhythm strips and did not see any evidence of a high risk supraventricular or ventricular tachycardia.    Today, Ms. Lawson notes that she has had palpitations on a near-daily basis since high school.  More recently, Ms. Lawson has seen that her tracked symptoms correlated with tachycardia, which she has seen on her Apple Watch.  She has noted that the tachycardia worsens when she stands and improves when she is lying down.  Her palpitations/tachycardia do seem to be associate with lightheadedness and dizziness/vertigo several times a day.  The episodes of lightheadedness are mostly a postural dizziness.  She has been able to avoid losing consciousness by bracing herself and rising slowly from a seated/lying position.  Some episodes can last for several minutes.  There are no specific triggers or relieving features.     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 She is a member of the [employer] band. She is able to consistently sleep approximately 7 hours per night with a fairly consistent bedtime and waking time.  She has  very minimal intake of caffeine/stimulants.      At the time of the consultation, Ms. Lawson notes an absence of chest pain at rest, dyspnea at rest , PND, orthopnea, or peripheral edema. She does not have a regular menstrual cycle due to having an IUD in situ.  She has not had any significant changes in her appetite or weight    A comprehensive ROS was done and the details are included above in the HPI.    Interval History 4/21/23:    Since Ms. Lawson's last visit, she got her BMP done. The BMP was unremarkable.     Today, Ms. Lawson notes that her palpitations were improved with the commencement of  metoprolol tartrate 12.5 mg BID. Her HR is not going as high in class.  She took an extra 12.5 milligram dose in the middle of the day and had increased lethargy.  Ms. Lawson notes that she still has significant pain from her IUD.    A comprehensive ROS was done and the details are included above in the HPI.    Past Medical History:  Asthma  Bipolar disorder  Dyslipidemia as medication side effect  - No prior history of hypertension, T2DM, CAD, TIA/stroke, vascular aneurysms, PAD  Past Medical History:   Diagnosis Date     Asthma      Bipolar depression (H)        Past Surgical History:    Past Surgical History:   Procedure Laterality Date     TONSILLECTOMY         Drug History:  Home cardiac meds: Metoprolol tartrate 12.5 mg BID  Current Outpatient Medications   Medication Sig Dispense Refill     amphetamine-dextroamphetamine (ADDERALL XR) 30 MG 24 hr capsule        lamoTRIgine (LAMICTAL) 100 MG tablet Take 100 mg by mouth daily       LATUDA 20 MG TABS tablet TAKE 1 TABLET BY MOUTH DAILY WITH AT LEAST 350 CALORIES OF FOOD       loratadine (CLARITIN) 10 MG tablet Take 10 mg by mouth daily       sodium chloride 0.9 % neb solution 100 count box of 5mL vials for use as directed with medically necessary contacts. No neb needed. 5 mL 11     metoprolol tartrate (LOPRESSOR) 25 MG tablet Take 0.5 tablets (12.5 mg) by mouth 2 times  "daily for 90 days 90 tablet 3         Family History:  - No family history of sudden cardiac death, premature CAD, valvular disorders  Family History   Problem Relation Age of Onset     Menstrual problems Mother      Menstrual problems Sister      Glaucoma No family hx of      Macular Degeneration No family hx of        Social History:    Social History     Tobacco Use     Smoking status: Never     Smokeless tobacco: Never   Vaping Use     Vaping status: Not on file   Substance Use Topics     Alcohol use: Never       No Known Allergies      Physical Examination:  Vitals: /78 (BP Location: Right arm, Patient Position: Chair, Cuff Size: Adult Regular)   Pulse 87   Ht 1.665 m (5' 5.55\")   Wt 71.5 kg (157 lb 11.2 oz)   SpO2 99%   BMI 25.80 kg/m    BMI= Body mass index is 25.8 kg/m .    GENERAL: Healthy, alert and no distress  Eyes: No xanthelasmas.  NECK: No neck masses/goitre.   ENT: Moist mucosal membranes.  RESPIRATORY: No signs of resp distress.   CARDIOVASCULAR:  No JVD.  EXTREMITIES: Well-perfused, no edema.   NEUROLOGY: GCS 15/15, no focal deficits.  SKIN: No ecchymoses, no rashes.  PSYCH: Cooperative, pleasant affect.       Investigations:  I personally viewed and interpreted the following investigations:    Labs:  Lab Results   Component Value Date     10/27/2022    POTASSIUM 3.5 10/27/2022    CHLORIDE 105 10/27/2022    CO2 27 10/27/2022    ANIONGAP 8 10/27/2022    GLC 81 10/27/2022    BUN 7.3 10/27/2022    CR 0.65 10/27/2022    GFRESTIMATED >90 10/27/2022    CASSIDY 9.3 10/27/2022       TSH 1.98 in July 2022    Recent Tests:  Echocardiogram (08/04/2022):  Tachycardia noted.     Left ventricular size, wall motion and function are normal. The ejection fraction is 60-65%.  Right ventricular function, chamber size, wall motion, and thickness are normal.  The inferior vena cava was normal in size with preserved respiratory  variability. No pericardial effusion is present.     There is no prior study " for direct comparison.      Assessment and Plan:   Arabella Lawson is a 22 year old female with a past medical history of dyslipidemia, ADHD, and bipolar disorder who presented to me for evaluation of tachycardia, palpitations, and lightheadedness in October 2022.    I was reassured to hear that Ms. Lawson had an improvement her symptoms.  We did talk about different strategies to increase her metoprolol dose to see if her symptoms would further improve.  We eventually jointly decided that she will try increasing her metoprolol to 25/12.5 AM/PM I did also talk to her about the fact that her palpitations/tachycardia may be driven by noncardiac stimuli, such as pain from her IUD.    Problems:  Autonomic dysfunction    Plan:  - Continue regular physical activity  - Raise metoprolol tartrate to 25/12.5 mg daily  - RTC 3-5 months    A total of 40 minutes were spent on the day of the visit for chart review, care coordination, face-to-face consultation with the patient, and documentation.       Thomas Pizarro [facility] City[place]omen[place], [place]    Cardiovascular Division  Pager [phone]    CC  Patient Care Team:  Radha Galicia NP as PCP - General (Nurse Practitioner - Family)  El Ott MD as Resident (Student in organized health care education/training program)  Thad Kramer OD as Assigned Surgical Provider  Thad Kramer OD as MD (Optometry)  Anai Correa OD (Optometry)  Leo Gonzalez MD as Assigned PCP  Thomas Pizarro MD as MD (Cardiovascular Disease)  Thomas Pizarro MD as Assigned Heart and Vascular Provider

## 2023-04-21 ENCOUNTER — OFFICE VISIT (OUTPATIENT)
Dept: CARDIOLOGY | Facility: CLINIC | Age: 23
End: 2023-04-21
Attending: STUDENT IN AN ORGANIZED HEALTH CARE EDUCATION/TRAINING PROGRAM
Payer: COMMERCIAL

## 2023-04-21 VITALS
OXYGEN SATURATION: 99 % | HEIGHT: 66 IN | DIASTOLIC BLOOD PRESSURE: 78 MMHG | WEIGHT: 157.7 LBS | SYSTOLIC BLOOD PRESSURE: 119 MMHG | HEART RATE: 87 BPM | BODY MASS INDEX: 25.34 KG/M2

## 2023-04-21 DIAGNOSIS — R00.2 PALPITATIONS: ICD-10-CM

## 2023-04-21 DIAGNOSIS — R00.0 TACHYCARDIA: Primary | ICD-10-CM

## 2023-04-21 PROCEDURE — 99215 OFFICE O/P EST HI 40 MIN: CPT | Performed by: STUDENT IN AN ORGANIZED HEALTH CARE EDUCATION/TRAINING PROGRAM

## 2023-04-21 PROCEDURE — G0463 HOSPITAL OUTPT CLINIC VISIT: HCPCS | Performed by: STUDENT IN AN ORGANIZED HEALTH CARE EDUCATION/TRAINING PROGRAM

## 2023-04-21 RX ORDER — LORATADINE 10 MG/1
10 TABLET ORAL DAILY
COMMUNITY

## 2023-04-21 ASSESSMENT — PAIN SCALES - GENERAL: PAINLEVEL: NO PAIN (0)

## 2023-04-21 NOTE — NURSING NOTE
Chief Complaint   Patient presents with     Follow Up     Return [facility] pt 6 month follow-up. Sinus tachycardia with lightheadness     Vitals were taken and medications reconciled.    Gus Gutierrez, EMT  9:26 AM

## 2023-04-21 NOTE — LETTER
4/21/2023      RE: Arabella Lawson  [address]  [address]  [place] [address]       Dear Colleague,    Thank you for the opportunity to participate in the care of your patient, Arabella Lawson, at the [facility]. Please see a copy of my visit note below.            Chief Complaint: Palpitations    HPI (October 21, 2022): Arabella Lawson is a 21 year old female with a past medical history of asthma and bipolar disorder who was referred to me for evaluation of palpitations by Dr. Leo Gonzalez.     Ms. Lawson began to experience symptoms of dizziness, brain fog, fine motor issues, and hot flashes.  This prompted her psychiatry team to obtain an EKG.  EKG was done in July 2020 and showed that she had sinus tachycardia.  She was subsequently referred to Dr. Gonzalez in July 2022. Dr. Gonzalez ordered a TTE and a 14-day Biotel monitor in August.  The transthoracic echocardiogram was devoid of any structural heart disease and her ambulatory cardiac monitor showed that she had 19 triggers, 17 of which correlated with sinus tachycardia.  Most of these events were in the 110 to 120 bpm range.  I personally reviewed the rhythm strips and did not see any evidence of a high risk supraventricular or ventricular tachycardia.    Today, Ms. Lawson notes that she has had palpitations on a near-daily basis since high school.  More recently, Ms. Lawson has seen that her tracked symptoms correlated with tachycardia, which she has seen on her Apple Watch.  She has noted that the tachycardia worsens when she stands and improves when she is lying down.  Her palpitations/tachycardia do seem to be associate with lightheadedness and dizziness/vertigo several times a day.  The episodes of lightheadedness are mostly a postural dizziness.  She has been able to avoid losing consciousness by bracing herself and rising slowly from a seated/lying  position.  Some episodes can last for several minutes.  There are no specific triggers or relieving features.     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 She is a member of the [employer] band. She is able to consistently sleep approximately 7 hours per night with a fairly consistent bedtime and waking time.  She has very minimal intake of caffeine/stimulants.      At the time of the consultation, Ms. Lawson notes an absence of chest pain at rest, dyspnea at rest , PND, orthopnea, or peripheral edema. She does not have a regular menstrual cycle due to having an IUD in situ.  She has not had any significant changes in her appetite or weight    A comprehensive ROS was done and the details are included above in the HPI.    Interval History 4/21/23:    Since Ms. Lawson's last visit, she got her BMP done. The BMP was unremarkable.     Today, Ms. Lawson notes that her palpitations were improved with the commencement of  metoprolol tartrate 12.5 mg BID. Her HR is not going as high in class.  She took an extra 12.5 milligram dose in the middle of the day and had increased lethargy.  Ms. Lawson notes that she still has significant pain from her IUD.    A comprehensive ROS was done and the details are included above in the HPI.    Past Medical History:  Asthma  Bipolar disorder  Dyslipidemia as medication side effect  - No prior history of hypertension, T2DM, CAD, TIA/stroke, vascular aneurysms, PAD  Past Medical History:   Diagnosis Date    Asthma     Bipolar depression (H)        Past Surgical History:    Past Surgical History:   Procedure Laterality Date    TONSILLECTOMY         Drug History:  Home cardiac meds: Metoprolol tartrate 12.5 mg BID  Current Outpatient Medications   Medication Sig Dispense Refill    amphetamine-dextroamphetamine (ADDERALL XR) 30 MG 24 hr capsule       lamoTRIgine (LAMICTAL) 100 MG tablet Take 100 mg by mouth daily      LATUDA 20 MG TABS tablet TAKE 1  "TABLET BY MOUTH DAILY WITH AT LEAST 350 CALORIES OF FOOD      loratadine (CLARITIN) 10 MG tablet Take 10 mg by mouth daily      sodium chloride 0.9 % neb solution 100 count box of 5mL vials for use as directed with medically necessary contacts. No neb needed. 5 mL 11    metoprolol tartrate (LOPRESSOR) 25 MG tablet Take 0.5 tablets (12.5 mg) by mouth 2 times daily for 90 days 90 tablet 3         Family History:  - No family history of sudden cardiac death, premature CAD, valvular disorders  Family History   Problem Relation Age of Onset    Menstrual problems Mother     Menstrual problems Sister     Glaucoma No family hx of     Macular Degeneration No family hx of        Social History:    Social History     Tobacco Use    Smoking status: Never    Smokeless tobacco: Never   Vaping Use    Vaping status: Not on file   Substance Use Topics    Alcohol use: Never       No Known Allergies      Physical Examination:  Vitals: /78 (BP Location: Right arm, Patient Position: Chair, Cuff Size: Adult Regular)   Pulse 87   Ht 1.665 m (5' 5.55\")   Wt 71.5 kg (157 lb 11.2 oz)   SpO2 99%   BMI 25.80 kg/m    BMI= Body mass index is 25.8 kg/m .    GENERAL: Healthy, alert and no distress  Eyes: No xanthelasmas.  NECK: No neck masses/goitre.   ENT: Moist mucosal membranes.  RESPIRATORY: No signs of resp distress.   CARDIOVASCULAR:  No JVD.  EXTREMITIES: Well-perfused, no edema.   NEUROLOGY: GCS 15/15, no focal deficits.  SKIN: No ecchymoses, no rashes.  PSYCH: Cooperative, pleasant affect.       Investigations:  I personally viewed and interpreted the following investigations:    Labs:  Lab Results   Component Value Date     10/27/2022    POTASSIUM 3.5 10/27/2022    CHLORIDE 105 10/27/2022    CO2 27 10/27/2022    ANIONGAP 8 10/27/2022    GLC 81 10/27/2022    BUN 7.3 10/27/2022    CR 0.65 10/27/2022    GFRESTIMATED >90 10/27/2022    CASSIDY 9.3 10/27/2022       TSH 1.98 in July 2022    Recent Tests:  Echocardiogram " (08/04/2022):  Tachycardia noted.     Left ventricular size, wall motion and function are normal. The ejection fraction is 60-65%.  Right ventricular function, chamber size, wall motion, and thickness are normal.  The inferior vena cava was normal in size with preserved respiratory  variability. No pericardial effusion is present.     There is no prior study for direct comparison.      Assessment and Plan:   Arabella Lawson is a 22 year old female with a past medical history of dyslipidemia, ADHD, and bipolar disorder who presented to me for evaluation of tachycardia, palpitations, and lightheadedness in October 2022.    I was reassured to hear that Ms. Lawson had an improvement her symptoms.  We did talk about different strategies to increase her metoprolol dose to see if her symptoms would further improve.  We eventually jointly decided that she will try increasing her metoprolol to 25/12.5 AM/PM I did also talk to her about the fact that her palpitations/tachycardia may be driven by noncardiac stimuli, such as pain from her IUD.    Problems:  Autonomic dysfunction    Plan:  - Continue regular physical activity  - Raise metoprolol tartrate to 25/12.5 mg daily  - RTC 3-5 months    A total of 40 minutes were spent on the day of the visit for chart review, care coordination, face-to-face consultation with the patient, and documentation.       Thomas Pizarro, [facility], MS    Cardiovascular Division  Pager [phone]    CC  Patient Care Team:  Radha Galicia NP as PCP - General (Nurse Practitioner - Family)  El Ott MD as Resident (Student in organized health care education/training program)  Thad Kramer OD as Assigned Surgical Provider  Thad Kramer OD as MD (Optometry)  Anai Correa OD (Optometry)  eLo Gonzalez MD as Assigned PCP  Thomas Pizarro MD as MD (Cardiovascular Disease)  Thomas Pizarro MD as Assigned Heart and Vascular Provider        Please do not hesitate  to contact me if you have any questions/concerns.     Sincerely,     Thomas Pizarro MD

## 2023-05-08 ENCOUNTER — HEALTH MAINTENANCE LETTER (OUTPATIENT)
Age: 23
End: 2023-05-08

## 2024-03-07 DIAGNOSIS — R42 LIGHT HEADEDNESS: ICD-10-CM

## 2024-03-07 DIAGNOSIS — R00.2 PALPITATIONS: ICD-10-CM

## 2024-03-13 ENCOUNTER — MYC REFILL (OUTPATIENT)
Dept: CARDIOLOGY | Facility: CLINIC | Age: 24
End: 2024-03-13
Payer: COMMERCIAL

## 2024-03-13 DIAGNOSIS — R00.2 PALPITATIONS: ICD-10-CM

## 2024-03-13 DIAGNOSIS — R42 LIGHT HEADEDNESS: ICD-10-CM

## 2024-03-13 RX ORDER — METOPROLOL TARTRATE 25 MG/1
TABLET, FILM COATED ORAL
Qty: 135 TABLET | Refills: 2 | Status: CANCELLED | OUTPATIENT
Start: 2024-03-13

## 2024-03-13 NOTE — TELEPHONE ENCOUNTER
metoprolol tartrate (LOPRESSOR) 25 MG tablet 135 tablet 2 6/19/2023       Last Office Visit: 4/21/23  Future Office visit:   none      Beta-Blockers Protocol [id number]/08/2024 10:59 AM   Protocol Details Medication indicated for associated diagnosis          Routing refill request to provider for review/approval because:  Diagnosis does not match medication indication, cannot fill per protocol.     Dinah Thomas RN  P Red Flag Triage/MRT

## 2024-03-18 RX ORDER — METOPROLOL TARTRATE 25 MG/1
TABLET, FILM COATED ORAL
Qty: 135 TABLET | Refills: 2 | Status: SHIPPED | OUTPATIENT
Start: 2024-03-18

## 2024-03-18 NOTE — TELEPHONE ENCOUNTER
metoprolol tartrate (LOPRESSOR) 25 MG tablet      Disp Refills Start End VICTOR M   metoprolol tartrate (LOPRESSOR) 25 MG tablet 135 tablet 2 3/18/2024 -- No   Sig: Take 1 tablet (25 mg) by mouth in the morning and 0.5 tablet (12.5 mg) in the evening   Sent to pharmacy as: Metoprolol Tartrate 25 MG Oral Tablet (LOPRESSOR)   Class: E-Prescribe   Order: [id number]   E-Prescribing Status: Receipt confirmed by pharmacy (3/18/2024  8:47 AM CDT)     Printout Tracking    External Result Report     Medication Administration Instructions    Take 1 tablet (25 mg) by mouth in the morning and 0.5 tablet (12.5 mg) in the evening     Pharmacy    [id number] [place], [place] - [address] AT [facility]

## 2024-07-14 ENCOUNTER — HEALTH MAINTENANCE LETTER (OUTPATIENT)
Age: 24
End: 2024-07-14

## 2024-08-08 ENCOUNTER — OFFICE VISIT (OUTPATIENT)
Dept: OBGYN | Facility: CLINIC | Age: 24
End: 2024-08-08
Attending: NURSE PRACTITIONER
Payer: COMMERCIAL

## 2024-08-08 VITALS
DIASTOLIC BLOOD PRESSURE: 77 MMHG | HEART RATE: 96 BPM | BODY MASS INDEX: 27.71 KG/M2 | SYSTOLIC BLOOD PRESSURE: 116 MMHG | HEIGHT: 66 IN | WEIGHT: 172.4 LBS

## 2024-08-08 DIAGNOSIS — Z30.430 ENCOUNTER FOR IUD INSERTION: Primary | ICD-10-CM

## 2024-08-08 DIAGNOSIS — Z30.432 ENCOUNTER FOR IUD REMOVAL: ICD-10-CM

## 2024-08-08 DIAGNOSIS — Z11.3 SCREEN FOR STD (SEXUALLY TRANSMITTED DISEASE): ICD-10-CM

## 2024-08-08 LAB
HCG UR QL: NEGATIVE
INTERNAL QC OK POCT: NORMAL
POCT KIT EXPIRATION DATE: NORMAL
POCT KIT LOT NUMBER: NORMAL

## 2024-08-08 PROCEDURE — 250N000011 HC RX IP 250 OP 636: Performed by: NURSE PRACTITIONER

## 2024-08-08 PROCEDURE — 87591 N.GONORRHOEAE DNA AMP PROB: CPT | Performed by: NURSE PRACTITIONER

## 2024-08-08 PROCEDURE — 87491 CHLMYD TRACH DNA AMP PROBE: CPT | Performed by: NURSE PRACTITIONER

## 2024-08-08 PROCEDURE — 58300 INSERT INTRAUTERINE DEVICE: CPT | Performed by: NURSE PRACTITIONER

## 2024-08-08 PROCEDURE — 58301 REMOVE INTRAUTERINE DEVICE: CPT | Performed by: NURSE PRACTITIONER

## 2024-08-08 PROCEDURE — 81025 URINE PREGNANCY TEST: CPT | Performed by: NURSE PRACTITIONER

## 2024-08-08 RX ORDER — DEXTROAMPHETAMINE SACCHARATE, AMPHETAMINE ASPARTATE, DEXTROAMPHETAMINE SULFATE AND AMPHETAMINE SULFATE 5; 5; 5; 5 MG/1; MG/1; MG/1; MG/1
TABLET ORAL
COMMUNITY
Start: 2024-01-23

## 2024-08-08 RX ADMIN — LEVONORGESTREL 1 EACH: 52 INTRAUTERINE DEVICE INTRAUTERINE at 15:01

## 2024-08-08 NOTE — PROGRESS NOTES
"Procedure Note    SUBJECTIVE:     Arabella Lawson is a 23 year old , requests her Mirena IUD be removed and replaced with a new Mirena.    Arianne's current Mirena was placed in 2020. She reports that for the first year and a half she had no bleeding or cramping, and very much enjoyed this. After that time, her periods and cramping gradually returned, feeling similar to prior to IUD insertion. For the past 6 months she has had pain with sex and bleeding after sex. She also reports several yeast infections that she has treated in the past year.     Sexual history:  One male partner in the past year  No concerns for STI's, open to screening today  Mirena for contraception.     Up to date on pap:   3/21/23 NILM    Verification of Procedure:  Just prior the procedure through verbal and active participation of team members, I verified:     Initials   Patient Name SLD   Patient  SLD   Procedure to be performed SLD     Consent:  Risks, benefits of treatment, and alternative options for contraception were discussed.  Patient's questions were elicited and answered.  Written consent was obtained and scanned into medical record.     OBJECTIVE: /77   Pulse 96   Ht 1.665 m (5' 5.55\")   Wt 78.2 kg (172 lb 6.4 oz)   BMI 28.21 kg/m      Pelvic Exam:  EG/BUS: Normal genital architecture without lesions, erythema or abnormal secretions. Bartholin's, Urethra, Cascade's glands are normal.   Vagina: moist, pink, rugae with creamy, white, and odorless secretions  Cervix: no lesions, pink, closed; IUD strings 3cm from the external cervical os  Rectum: anus normal    PROCEDURE NOTE  --  Mirena Removal and Re-Insertion    Reason for Removal and Re-insertion:  contraception and menstrual managment.    Pregnancy test: Negative    Counseling:  Patient counseled on efficacy, benefits, risks, and potential side effects of IUD.  Insertion procedure and risk of infection, perforation, and spontaneous expulsion reviewed. "  Advised to plan removal and/or replacement of IUD in 8 years from today or when desired.       Arabella  was pre-medicated with ibuprofen prior to beginning the procedure.  She also opts for a paracervical block.     Mirena IUD removal procedure:    Cervix was visualized with a medium Henrietta speculum and prepped with betadine. 2 mL of 1% lidocaine with epi were injected at the 12 o'clock position into the cervix. The tenaculum grasped the cervix at the 12 o'clock position.  2 mL of lidocaine with epi were then injected at the 4 and 8 o'clock positions. The strings were grasped with a uterine packing forceps and the IUD removed with gentle traction.  No resistance was encountered.  The Mirena IUD was noted to be intact.  There were no complications.  Arabella Lawson reported a mild cramp.  There was scant bleeding.     Re-insertion procedure  The uterus was sounded to 7.5 cm. The Mirena IUD insertion apparatus was prepared and placed through the cervix without significant resistance and deployed at the fundus in the usual fashion. The strings were trimmed 3 cm from the external os.      Device Lot #: [id number]  Device Expiration Date: June 2026     EBL:  10 mL     Complications: None      Arabella Lawson tolerated procedure well.    PLAN:      Written information on IUD use reviewed and given.  Symptoms to report reviewed. To report heavy bleeding, severe cramping, or abnormal vaginal discharge.  May take Ibuprofen 400-800 mg PO TID PRN. Reminded to check for IUD strings every month.  Patient has been counseled to use backup birth control method for 1 week.  Return to clinic in 4-6 weeks for string check and follow-up on symptoms she has been experiencing. Arabella Lawson  verbalized understanding of instructions.    Pt left, stable. She expressed understanding and agreement with the plan for care.     IKyleigh DNP student, completed the PFSH and ROS. I then acted as a scribe for Zuleima Pritchett  NAZANIN, for the remainder of the visit.    I agree with the PFSH and ROS as completed by the NAZANIN Student, except for changes made by me.  The remainder of the encounter was performed by me and scribed by the NAZANIN Student.  The scribed note accurately reflects my personal services and decisions made by me.  Zuleima Pritchett, DNP, APRN, WHNP

## 2024-08-08 NOTE — LETTER
"2024       RE: Arabella Lawson  [address]  [address]  [place] [address]     Dear Colleague,    Thank you for referring your patient, Arabella Lawson, to the [facility] [place] at [facility]. Please see a copy of my visit note below.    Procedure Note    SUBJECTIVE:     Arabella Lawson is a 23 year old , requests her Mirena IUD be removed and replaced with a new Mirena.    Arianne's current Mirena was placed in 2020. She reports that for the first year and a half she had no bleeding or cramping, and very much enjoyed this. After that time, her periods and cramping gradually returned, feeling similar to prior to IUD insertion. For the past 6 months she has had pain with sex and bleeding after sex. She also reports several yeast infections that she has treated in the past year.     Sexual history:  One male partner in the past year  No concerns for STI's, open to screening today  Mirena for contraception.     Up to date on pap:   3/21/23 NILM    Verification of Procedure:  Just prior the procedure through verbal and active participation of team members, I verified:     Initials   Patient Name SLD   Patient  SLD   Procedure to be performed SLD     Consent:  Risks, benefits of treatment, and alternative options for contraception were discussed.  Patient's questions were elicited and answered.  Written consent was obtained and scanned into medical record.     OBJECTIVE: /77   Pulse 96   Ht 1.665 m (5' 5.55\")   Wt 78.2 kg (172 lb 6.4 oz)   BMI 28.21 kg/m      Pelvic Exam:  EG/BUS: Normal genital architecture without lesions, erythema or abnormal secretions. Bartholin's, Urethra, Megargel's glands are normal.   Vagina: moist, pink, rugae with creamy, white, and odorless secretions  Cervix: no lesions, pink, closed; IUD strings 3cm from the external cervical os  Rectum: anus normal    PROCEDURE NOTE  --  " Mirena Removal and Re-Insertion    Reason for Removal and Re-insertion:  contraception and menstrual managment.    Pregnancy test: Negative    Counseling:  Patient counseled on efficacy, benefits, risks, and potential side effects of IUD.  Insertion procedure and risk of infection, perforation, and spontaneous expulsion reviewed.  Advised to plan removal and/or replacement of IUD in 8 years from today or when desired.       Arabella  was pre-medicated with ibuprofen prior to beginning the procedure.  She also opts for a paracervical block.     Mirena IUD removal procedure:    Cervix was visualized with a medium Henrietta speculum and prepped with betadine. 2 mL of 1% lidocaine with epi were injected at the 12 o'clock position into the cervix. The tenaculum grasped the cervix at the 12 o'clock position.  2 mL of lidocaine with epi were then injected at the 4 and 8 o'clock positions. The strings were grasped with a uterine packing forceps and the IUD removed with gentle traction.  No resistance was encountered.  The Mirena IUD was noted to be intact.  There were no complications.  Arabella Lawson reported a mild cramp.  There was scant bleeding.     Re-insertion procedure  The uterus was sounded to 7.5 cm. The Mirena IUD insertion apparatus was prepared and placed through the cervix without significant resistance and deployed at the fundus in the usual fashion. The strings were trimmed 3 cm from the external os.      Device Lot #: [id number]  Device Expiration Date: June 2026     EBL:  10 mL     Complications: None      Arabella Lawson tolerated procedure well.    PLAN:      Written information on IUD use reviewed and given.  Symptoms to report reviewed. To report heavy bleeding, severe cramping, or abnormal vaginal discharge.  May take Ibuprofen 400-800 mg PO TID PRN. Reminded to check for IUD strings every month.  Patient has been counseled to use backup birth control method for 1 week.  Return to clinic in 4-6  weeks for string check and follow-up on symptoms she has been experiencing. Arabella Lawson  verbalized understanding of instructions.    Pt left, stable. She expressed understanding and agreement with the plan for care.     I, Kyleigh Evans DNP student, completed the PFSH and ROS. I then acted as a scribe for NAZANIN Walker, for the remainder of the visit.    I agree with the PFSH and ROS as completed by the NAZANIN Student, except for changes made by me.  The remainder of the encounter was performed by me and scribed by the NAZANIN Student.  The scribed note accurately reflects my personal services and decisions made by me.  Zuleima Pritchett DNP, MARYANN, NAZANIN        Again, thank you for allowing me to participate in the care of your patient.      Sincerely,    MARYANN Smith CNP

## 2024-08-08 NOTE — PATIENT INSTRUCTIONS
Patient Education   Intrauterine Device (IUD) Insertion: Care Instructions  Overview     An intrauterine device (IUD) is a very effective method of birth control. It is a small, plastic, T-shaped device that uses copper or hormones to prevent pregnancy. The doctor places the IUD into your uterus. Plastic strings tied to the end of the IUD hang down through the cervix into the vagina. Your doctor may teach you how to check the placement of your IUD by feeling the strings.  You can have an IUD inserted at any time, as long as you aren't pregnant and you don't have a pelvic infection. Be sure to tell your doctor about any health problems you have or medicines you take. The IUD can also be placed right after you have a baby.  There are two types of IUDs. The copper IUD works for up to 12 years. The hormonal IUD works for 3 to 8 years, depending on which brand you have. Talk to your doctor about which IUD is right for you and how long you can use it. The hormonal IUD also usually reduces menstrual bleeding and cramping.  Follow-up care is a key part of your treatment and safety. Be sure to make and go to all appointments, and call your doctor if you are having problems. It's also a good idea to know your test results and keep a list of the medicines you take.  How can you care for yourself at home?  It's safe to use while breastfeeding.  You may experience some mild cramping and light bleeding (spotting) for 1 or 2 days. Use a hot water bottle or a heating pad set on low on your belly for pain.  Take an over-the-counter pain medicine, such as acetaminophen (Tylenol), ibuprofen (Advil, Motrin), and naproxen (Aleve) if needed. Read and follow all instructions on the label.  Do not take two or more pain medicines at the same time unless the doctor told you to. Many pain medicines have acetaminophen, which is Tylenol. Too much acetaminophen (Tylenol) can be harmful.  If you want to check the string of your IUD, insert a  "finger into your vagina and feel for the cervix, which is at the top of the vagina and feels harder than the rest of your vagina. You should be able to feel the thin, plastic string coming out of the opening of your cervix. If you cannot feel the string, use another form of birth control and make an appointment with your doctor to have the string checked.  If the IUD comes out, save it and call your doctor. Be sure to use another form of birth control while the IUD is out.  Use condoms to protect against sexually transmitted infections (STIs), such as gonorrhea and chlamydia. An IUD does not protect you from STIs.  When should you call for help?   Call 911 anytime you think you may need emergency care. For example, call if:    You passed out (lost consciousness).     You have sudden, severe pain in your belly or pelvis.   Call your doctor now or seek immediate medical care if:    You have new belly or pelvic pain.     You have severe vaginal bleeding. This means that you are soaking through your usual pads or tampons each hour for 2 or more hours.     You are dizzy or lightheaded, or you feel like you may faint.     You have a fever and pelvic pain or vaginal discharge.     You have pelvic pain that is getting worse.   Watch closely for changes in your health, and be sure to contact your doctor if:    You cannot feel the string, or the IUD comes out.     You think you may be pregnant.   Where can you learn more?  Go to https://www.Desk.net/patiented  Enter U681 in the search box to learn more about \"Intrauterine Device (IUD) Insertion: Care Instructions.\"  Current as of: November 27, 2023               Content Version: 14.0    [id number] [employer].   Care instructions adapted under license by your healthcare professional. If you have questions about a medical condition or this instruction, always ask your healthcare professional. [employer] disclaims any warranty or liability for " your use of this information.       Thank you for trusting us with your care!   Please be aware, if you are on Mychart, you may see your results prior to your providers review. If labs are abnormal, we will call or message you on Socialscope with a follow up plan.    If you need to contact us for questions about:  Symptoms, Scheduling & Medical Questions; Non-urgent (2-3 day response) Socialscope message, Urgent (needing response today) [phone] (if after 3:30pm next day response)   Prescriptions: Please call your Pharmacy   Billing: Louie [phone] or  Physicians:[phone]

## 2024-08-09 LAB
C TRACH DNA SPEC QL NAA+PROBE: NEGATIVE
N GONORRHOEA DNA SPEC QL NAA+PROBE: NEGATIVE

## 2024-09-20 ENCOUNTER — OFFICE VISIT (OUTPATIENT)
Dept: OBGYN | Facility: CLINIC | Age: 24
End: 2024-09-20
Attending: REGISTERED NURSE
Payer: COMMERCIAL

## 2024-09-20 VITALS
HEART RATE: 86 BPM | SYSTOLIC BLOOD PRESSURE: 108 MMHG | DIASTOLIC BLOOD PRESSURE: 73 MMHG | WEIGHT: 173 LBS | BODY MASS INDEX: 28.31 KG/M2

## 2024-09-20 DIAGNOSIS — Z30.431 IUD CHECK UP: Primary | ICD-10-CM

## 2024-09-20 PROCEDURE — G0463 HOSPITAL OUTPT CLINIC VISIT: HCPCS | Performed by: REGISTERED NURSE

## 2024-09-20 PROCEDURE — 99213 OFFICE O/P EST LOW 20 MIN: CPT | Performed by: REGISTERED NURSE

## 2024-09-20 NOTE — LETTER
2024       RE: Arabella Lawson  [address]  [address]  [place] [address]     Dear Colleague,    Thank you for referring your patient, Arabella Lawson, to the [facility] [place] at [facility]. Please see a copy of my visit note below.    Subjective:  Arabella Lawson 23 year old year old female, , who presents for IUD string check.  Had Mirena inserted on 2024, strings trimmed to 3cm. Pt reports small amount of bleeding after insertion, accompanied by cramping. Denies any symptoms now. No pain with intercourse.   She has been able to feel strings.     Objective:   /73   Pulse 86   Wt 78.5 kg (173 lb)   BMI 28.31 kg/m    She appears well, afebrile.    Pelvic Exam:  EG/BUS: Normal genitalia    Vagina: moist, pink, rugae with odorless, physiologic discharge, and clearsecretions  Cervix: IUD strings extend 3 cm from external os.    Assessment:   IUD in place, strings visible from os.     Plan:   Continue to check IUD strings monthly. RTC if unable to feel strings, experience pelvic pain, or have any other concerns related to your IUD. Mirena IUDs are effecitve for 8 years (2032)    Return to clinic for annual exam or prn    Elyse BROWNING, am serving as a scribe; to document services personally performed by  Abhishek Cassidy CNM based on data collection and the provider's statements to me.     Elyse Campos    I agree with the PFSH and ROS as completed by Elyse Tijerina except for changes made by me. The remainder of the encounter was performed by me and scribed by Elyse Campos. The scribed note accurately reflects my personal services and decisions made by me.     MARYANN Vela CNM        Again, thank you for allowing me to participate in the care of your patient.      Sincerely,    MARYANN Vela CNM

## 2024-09-20 NOTE — PROGRESS NOTES
Subjective:  Arabella Lawson 23 year old year old female, , who presents for IUD string check.  Had Mirena inserted on 2024, strings trimmed to 3cm. Pt reports small amount of bleeding after insertion, accompanied by cramping. Denies any symptoms now. No pain with intercourse.   She has been able to feel strings.     Objective:   /73   Pulse 86   Wt 78.5 kg (173 lb)   BMI 28.31 kg/m    She appears well, afebrile.    Pelvic Exam:  EG/BUS: Normal genitalia    Vagina: moist, pink, rugae with odorless, physiologic discharge, and clearsecretions  Cervix: IUD strings extend 3 cm from external os.    Assessment:   IUD in place, strings visible from os.     Plan:   Continue to check IUD strings monthly. RTC if unable to feel strings, experience pelvic pain, or have any other concerns related to your IUD. Mirena IUDs are effecitve for 8 years (2032)    Return to clinic for annual exam or prn    IElyse, am serving as a scribe; to document services personally performed by  Abhishek Cassidy CNM based on data collection and the provider's statements to me.     Elyse Campos    I agree with the PFSH and ROS as completed by Elyse Tijerina except for changes made by me. The remainder of the encounter was performed by me and scribed by Elyse Campos. The scribed note accurately reflects my personal services and decisions made by me.     MARYANN Vela CNM

## 2024-09-20 NOTE — PATIENT INSTRUCTIONS
Thank you for trusting us with your care!   Please be aware, if you are on Mychart, you may see your results prior to your providers review. If labs are abnormal, we will call or message you on Geckoboardt with a follow up plan.    If you need to contact us for questions about:  Symptoms, Scheduling & Medical Questions; Non-urgent (2-3 day response) Think Passenger message, Urgent (needing response today) [phone] (if after 3:30pm next day response)   Prescriptions: Please call your Pharmacy   Billing: Louie [phone] or [facility] Physicians:[phone]

## 2024-09-20 NOTE — NURSING NOTE
Chief Complaint   Patient presents with    Follow Up     IUD string check    Rosemary Goodman LPN

## 2024-12-13 DIAGNOSIS — R42 LIGHT HEADEDNESS: ICD-10-CM

## 2024-12-13 DIAGNOSIS — R00.2 PALPITATIONS: ICD-10-CM

## 2024-12-13 NOTE — TELEPHONE ENCOUNTER
metoprolol tartrate (LOPRESSOR) 25 MG tablet  Take 1 tablet (25 mg) by mouth in the morning and 0.5 tablet (12.5 mg) in the evening      Last Written Prescription Date:  3/18/24  Last Fill Quantity: 135,   # refills: 2  Last Office Visit : 4/21/23 [place]  Future Office visit:   Plan:  - Continue regular physical activity  - Raise metoprolol tartrate to 25/12.5 mg daily  - RTC 3-5 months    Routing refill request to provider for review/approval because:  Past due for appt. Last seen 4/21/23 [facility]

## 2024-12-16 RX ORDER — METOPROLOL TARTRATE 25 MG/1
TABLET, FILM COATED ORAL
Qty: 135 TABLET | Refills: 0 | Status: SHIPPED | OUTPATIENT
Start: 2024-12-16

## 2025-05-05 ENCOUNTER — APPOINTMENT (OUTPATIENT)
Dept: ULTRASOUND IMAGING | Facility: CLINIC | Age: 25
End: 2025-05-05
Attending: EMERGENCY MEDICINE
Payer: COMMERCIAL

## 2025-05-05 ENCOUNTER — HOSPITAL ENCOUNTER (EMERGENCY)
Facility: CLINIC | Age: 25
Discharge: HOME | End: 2025-05-06
Attending: EMERGENCY MEDICINE | Admitting: EMERGENCY MEDICINE
Payer: COMMERCIAL

## 2025-05-05 DIAGNOSIS — N83.202 LEFT OVARIAN CYST: ICD-10-CM

## 2025-05-05 LAB
ALBUMIN SERPL BCG-MCNC: 4.5 G/DL (ref 3.5–5.2)
ALBUMIN UR-MCNC: NEGATIVE MG/DL
ALP SERPL-CCNC: 68 U/L (ref 40–150)
ALT SERPL W P-5'-P-CCNC: 18 U/L (ref 0–50)
ANION GAP SERPL CALCULATED.3IONS-SCNC: 10 MMOL/L (ref 7–15)
APPEARANCE UR: CLEAR
AST SERPL W P-5'-P-CCNC: 24 U/L (ref 0–45)
BASOPHILS # BLD AUTO: 0.1 10E3/UL (ref 0–0.2)
BASOPHILS NFR BLD AUTO: 0 %
BILIRUB SERPL-MCNC: 0.2 MG/DL
BILIRUB UR QL STRIP: NEGATIVE
BUN SERPL-MCNC: 9.5 MG/DL (ref 6–20)
CALCIUM SERPL-MCNC: 9.2 MG/DL (ref 8.8–10.4)
CHLORIDE SERPL-SCNC: 103 MMOL/L (ref 98–107)
COLOR UR AUTO: ABNORMAL
CREAT SERPL-MCNC: 0.66 MG/DL (ref 0.51–0.95)
EGFRCR SERPLBLD CKD-EPI 2021: >90 ML/MIN/1.73M2
EOSINOPHIL # BLD AUTO: 0 10E3/UL (ref 0–0.7)
EOSINOPHIL NFR BLD AUTO: 0 %
ERYTHROCYTE [DISTWIDTH] IN BLOOD BY AUTOMATED COUNT: 11.2 % (ref 10–15)
GLUCOSE SERPL-MCNC: 102 MG/DL (ref 70–99)
GLUCOSE UR STRIP-MCNC: NEGATIVE MG/DL
HCO3 SERPL-SCNC: 25 MMOL/L (ref 22–29)
HCT VFR BLD AUTO: 37.5 % (ref 35–47)
HGB BLD-MCNC: 12.5 G/DL (ref 11.7–15.7)
HGB UR QL STRIP: NEGATIVE
IMM GRANULOCYTES # BLD: 0.1 10E3/UL
IMM GRANULOCYTES NFR BLD: 1 %
KETONES UR STRIP-MCNC: ABNORMAL MG/DL
LEUKOCYTE ESTERASE UR QL STRIP: NEGATIVE
LYMPHOCYTES # BLD AUTO: 1.6 10E3/UL (ref 0.8–5.3)
LYMPHOCYTES NFR BLD AUTO: 9 %
MCH RBC QN AUTO: 30.2 PG (ref 26.5–33)
MCHC RBC AUTO-ENTMCNC: 33.3 G/DL (ref 31.5–36.5)
MCV RBC AUTO: 91 FL (ref 78–100)
MONOCYTES # BLD AUTO: 1 10E3/UL (ref 0–1.3)
MONOCYTES NFR BLD AUTO: 6 %
NEUTROPHILS # BLD AUTO: 14.8 10E3/UL (ref 1.6–8.3)
NEUTROPHILS NFR BLD AUTO: 84 %
NITRATE UR QL: NEGATIVE
NRBC # BLD AUTO: 0 10E3/UL
NRBC BLD AUTO-RTO: 0 /100
PH UR STRIP: 6.5 [PH] (ref 5–7)
PLATELET # BLD AUTO: 331 10E3/UL (ref 150–450)
POTASSIUM SERPL-SCNC: 3.6 MMOL/L (ref 3.4–5.3)
PROT SERPL-MCNC: 6.8 G/DL (ref 6.4–8.3)
RBC # BLD AUTO: 4.14 10E6/UL (ref 3.8–5.2)
RBC URINE: 1 /HPF
SODIUM SERPL-SCNC: 138 MMOL/L (ref 135–145)
SP GR UR STRIP: 1.01 (ref 1–1.03)
SQUAMOUS EPITHELIAL: <1 /HPF
UROBILINOGEN UR STRIP-MCNC: NORMAL MG/DL
WBC # BLD AUTO: 17.7 10E3/UL (ref 4–11)
WBC URINE: <1 /HPF

## 2025-05-05 PROCEDURE — 81025 URINE PREGNANCY TEST: CPT | Performed by: EMERGENCY MEDICINE

## 2025-05-05 PROCEDURE — 76830 TRANSVAGINAL US NON-OB: CPT | Mod: 26 | Performed by: RADIOLOGY

## 2025-05-05 PROCEDURE — 85004 AUTOMATED DIFF WBC COUNT: CPT | Performed by: EMERGENCY MEDICINE

## 2025-05-05 PROCEDURE — 36415 COLL VENOUS BLD VENIPUNCTURE: CPT | Performed by: EMERGENCY MEDICINE

## 2025-05-05 PROCEDURE — 81001 URINALYSIS AUTO W/SCOPE: CPT | Performed by: EMERGENCY MEDICINE

## 2025-05-05 PROCEDURE — 84155 ASSAY OF PROTEIN SERUM: CPT | Performed by: EMERGENCY MEDICINE

## 2025-05-05 PROCEDURE — 99285 EMERGENCY DEPT VISIT HI MDM: CPT | Mod: 25 | Performed by: EMERGENCY MEDICINE

## 2025-05-05 PROCEDURE — 99284 EMERGENCY DEPT VISIT MOD MDM: CPT | Performed by: EMERGENCY MEDICINE

## 2025-05-05 PROCEDURE — 76856 US EXAM PELVIC COMPLETE: CPT | Mod: 26 | Performed by: RADIOLOGY

## 2025-05-05 PROCEDURE — 76830 TRANSVAGINAL US NON-OB: CPT

## 2025-05-05 RX ORDER — HYDROMORPHONE HYDROCHLORIDE 1 MG/ML
0.5 INJECTION, SOLUTION INTRAMUSCULAR; INTRAVENOUS; SUBCUTANEOUS
Status: DISCONTINUED | OUTPATIENT
Start: 2025-05-05 | End: 2025-05-06 | Stop reason: HOSPADM

## 2025-05-05 ASSESSMENT — ACTIVITIES OF DAILY LIVING (ADL)
ADLS_ACUITY_SCORE: 41

## 2025-05-06 VITALS
OXYGEN SATURATION: 99 % | HEIGHT: 64 IN | BODY MASS INDEX: 29.02 KG/M2 | TEMPERATURE: 98 F | DIASTOLIC BLOOD PRESSURE: 71 MMHG | SYSTOLIC BLOOD PRESSURE: 106 MMHG | WEIGHT: 170 LBS | HEART RATE: 77 BPM | RESPIRATION RATE: 16 BRPM

## 2025-05-06 LAB — HCG UR QL: NEGATIVE

## 2025-05-06 PROCEDURE — 250N000011 HC RX IP 250 OP 636: Mod: JZ | Performed by: EMERGENCY MEDICINE

## 2025-05-06 PROCEDURE — 96374 THER/PROPH/DIAG INJ IV PUSH: CPT | Performed by: EMERGENCY MEDICINE

## 2025-05-06 RX ORDER — HYDROCODONE BITARTRATE AND ACETAMINOPHEN 5; 325 MG/1; MG/1
1 TABLET ORAL EVERY 6 HOURS PRN
Qty: 10 TABLET | Refills: 0 | Status: SHIPPED | OUTPATIENT
Start: 2025-05-06 | End: 2025-05-09

## 2025-05-06 RX ORDER — KETOROLAC TROMETHAMINE 15 MG/ML
10 INJECTION, SOLUTION INTRAMUSCULAR; INTRAVENOUS ONCE
Status: COMPLETED | OUTPATIENT
Start: 2025-05-06 | End: 2025-05-06

## 2025-05-06 RX ADMIN — KETOROLAC TROMETHAMINE 10 MG: 15 INJECTION, SOLUTION INTRAMUSCULAR; INTRAVENOUS at 00:41

## 2025-05-06 ASSESSMENT — ACTIVITIES OF DAILY LIVING (ADL): ADLS_ACUITY_SCORE: 41

## 2025-05-06 NOTE — ED PROVIDER NOTES
[facility] EMERGENCY DEPARTMENT ([facility])    5/05/25       ED PROVIDER NOTE    History   No chief complaint on file.    HPI  Arabella Lawson is a 24 year old female who presents to the ED for evaluation of abdominal pain.  She notes sudden onset of left lower quadrant abdominal/pelvic pain after bending over today.  She has had 1 similar occurrence approximately 10 years ago that was caused by ovarian cyst.  Pain has somewhat improved since onset.  No radiation.  No other symptoms noted.    Past Medical History  Past Medical History:   Diagnosis Date    Asthma     Bipolar depression (H)      Past Surgical History:   Procedure Laterality Date    TONSILLECTOMY       amphetamine-dextroamphetamine (ADDERALL) 20 MG tablet  HYDROcodone-acetaminophen (NORCO) 5-325 MG tablet  lamoTRIgine (LAMICTAL) 100 MG tablet  LATUDA 20 MG TABS tablet  loratadine (CLARITIN) 10 MG tablet  levonorgestrel (MIRENA) 52 MG (20 mcg/day) IUD  metoprolol tartrate (LOPRESSOR) 25 MG tablet  sodium chloride 0.9 % neb solution      No Known Allergies  Family History  Family History   Problem Relation Age of Onset    Menstrual problems Mother     Menstrual problems Sister     Glaucoma No family hx of     Macular Degeneration No family hx of      Social History   Social History     Tobacco Use    Smoking status: Never    Smokeless tobacco: Never   Substance Use Topics    Alcohol use: Never    Drug use: Never      Past medical history, past surgical history, medications, allergies, family history, and social history were reviewed with the patient. No additional pertinent items.   A medically appropriate review of systems was performed with pertinent positives and negatives noted in the HPI, and all other systems negative.    Physical Exam      Physical Exam  Vitals and nursing note reviewed.   Constitutional:       General: She is not in acute distress.     Appearance: She is well-developed. She is not diaphoretic.   HENT:      Head:  Normocephalic and atraumatic.      Mouth/Throat:      Pharynx: No oropharyngeal exudate.   Eyes:      General: No scleral icterus.        Right eye: No discharge.         Left eye: No discharge.      Pupils: Pupils are equal, round, and reactive to light.   Cardiovascular:      Rate and Rhythm: Normal rate and regular rhythm.      Heart sounds: Normal heart sounds. No murmur heard.     No friction rub. No gallop.   Pulmonary:      Effort: Pulmonary effort is normal. No respiratory distress.      Breath sounds: Normal breath sounds. No wheezing.   Chest:      Chest wall: No tenderness.   Abdominal:      General: Bowel sounds are normal. There is no distension.      Palpations: Abdomen is soft.      Tenderness: There is abdominal tenderness in the left lower quadrant.   Musculoskeletal:         General: No tenderness or deformity. Normal range of motion.      Cervical back: Normal range of motion and neck supple.   Skin:     General: Skin is warm and dry.      Coloration: Skin is not pale.      Findings: No erythema or rash.   Neurological:      Mental Status: She is alert and oriented to person, place, and time.      Cranial Nerves: No cranial nerve deficit.           ED Course, Procedures, & Data      Procedures                No results found for any visits on 05/05/25.  Medications - No data to display  Labs Ordered and Resulted from Time of ED Arrival to Time of ED Departure - No data to display  No orders to display              Assessment & Plan    Is a 24-year-old female who presents with left lower quadrant abdominal pain, this started suddenly this afternoon.  On exam she has some tenderness to left lower quadrant.  She has had similar occurrence of the ovarian cyst in the past.  Lab work shows WBC count of 17.7.  Rest of lab work including UA and transplant pregnancy test are negative.  Pelvic ultrasound shows dominant follicular simple cyst on the left ovary.  Patient was given ketorolac.  She had  improvement of symptoms.  I discussed all results with patient.  Will discharge with return precautions.    I have reviewed the nursing notes. I have reviewed the findings, diagnosis, plan and need for follow up with the patient.    New Prescriptions    No medications on file       Final diagnoses:   None       Thad Gaspar DO  [facility] EMERGENCY DEPARTMENT  5/5/2025     Thad Gaspar DO  05/06/25 0107

## 2025-05-06 NOTE — ED NOTES
Pt discharged home by writer. Pt PIV removed. Given script and AVS and no concerns, pt agreeable to plan of care. Left amb with good gait to exit.

## 2025-05-06 NOTE — ED TRIAGE NOTES
Pt dian from dorm was at work 2 hours ago was bending over and started having LLQ pain. 10/10 pain. 100mcg fentanyl 2018.  4mg zofran.   hx of cyst, RA

## 2025-06-03 ENCOUNTER — HOSPITAL ENCOUNTER (OUTPATIENT)
Facility: CLINIC | Age: 25
Discharge: HOME | End: 2025-06-03
Attending: EMERGENCY MEDICINE | Admitting: EMERGENCY MEDICINE
Payer: COMMERCIAL

## 2025-06-03 ENCOUNTER — ANESTHESIA EVENT (OUTPATIENT)
Dept: SURGERY | Facility: CLINIC | Age: 25
End: 2025-06-03
Payer: COMMERCIAL

## 2025-06-03 ENCOUNTER — TELEPHONE (OUTPATIENT)
Dept: OBGYN | Facility: CLINIC | Age: 25
End: 2025-06-03

## 2025-06-03 ENCOUNTER — ANESTHESIA (OUTPATIENT)
Dept: SURGERY | Facility: CLINIC | Age: 25
End: 2025-06-03
Payer: COMMERCIAL

## 2025-06-03 ENCOUNTER — APPOINTMENT (OUTPATIENT)
Dept: ULTRASOUND IMAGING | Facility: CLINIC | Age: 25
End: 2025-06-03
Attending: EMERGENCY MEDICINE
Payer: COMMERCIAL

## 2025-06-03 VITALS
DIASTOLIC BLOOD PRESSURE: 73 MMHG | HEART RATE: 95 BPM | TEMPERATURE: 97.9 F | BODY MASS INDEX: 29.02 KG/M2 | SYSTOLIC BLOOD PRESSURE: 113 MMHG | RESPIRATION RATE: 18 BRPM | WEIGHT: 170 LBS | HEIGHT: 64 IN | OXYGEN SATURATION: 98 %

## 2025-06-03 DIAGNOSIS — N83.519 OVARIAN TORSION: Primary | ICD-10-CM

## 2025-06-03 DIAGNOSIS — Z98.890 S/P LAPAROSCOPY: ICD-10-CM

## 2025-06-03 LAB
ABO + RH BLD: NORMAL
ALBUMIN SERPL BCG-MCNC: 4.5 G/DL (ref 3.5–5.2)
ALBUMIN UR-MCNC: 10 MG/DL
ALP SERPL-CCNC: 71 U/L (ref 40–150)
ALT SERPL W P-5'-P-CCNC: 19 U/L (ref 0–50)
ANION GAP SERPL CALCULATED.3IONS-SCNC: 14 MMOL/L (ref 7–15)
APPEARANCE UR: CLEAR
AST SERPL W P-5'-P-CCNC: 21 U/L (ref 0–45)
BACTERIA #/AREA URNS HPF: ABNORMAL /HPF
BASOPHILS # BLD AUTO: 0 10E3/UL (ref 0–0.2)
BASOPHILS NFR BLD AUTO: 0 %
BILIRUB SERPL-MCNC: 0.2 MG/DL
BILIRUB UR QL STRIP: NEGATIVE
BLD GP AB SCN SERPL QL: NEGATIVE
BUN SERPL-MCNC: 11 MG/DL (ref 6–20)
CALCIUM SERPL-MCNC: 9.3 MG/DL (ref 8.8–10.4)
CHLORIDE SERPL-SCNC: 101 MMOL/L (ref 98–107)
COLOR UR AUTO: ABNORMAL
CREAT SERPL-MCNC: 0.65 MG/DL (ref 0.51–0.95)
EGFRCR SERPLBLD CKD-EPI 2021: >90 ML/MIN/1.73M2
EOSINOPHIL # BLD AUTO: 0 10E3/UL (ref 0–0.7)
EOSINOPHIL NFR BLD AUTO: 0 %
ERYTHROCYTE [DISTWIDTH] IN BLOOD BY AUTOMATED COUNT: 11.2 % (ref 10–15)
GLUCOSE SERPL-MCNC: 129 MG/DL (ref 70–99)
GLUCOSE UR STRIP-MCNC: NEGATIVE MG/DL
HCG SERPL QL: NEGATIVE
HCO3 SERPL-SCNC: 23 MMOL/L (ref 22–29)
HCT VFR BLD AUTO: 37.2 % (ref 35–47)
HGB BLD-MCNC: 12.5 G/DL (ref 11.7–15.7)
HGB UR QL STRIP: NEGATIVE
HYALINE CASTS: 1 /LPF
IMM GRANULOCYTES # BLD: 0.1 10E3/UL
IMM GRANULOCYTES NFR BLD: 0 %
KETONES UR STRIP-MCNC: 20 MG/DL
LEUKOCYTE ESTERASE UR QL STRIP: NEGATIVE
LIPASE SERPL-CCNC: 16 U/L (ref 13–60)
LYMPHOCYTES # BLD AUTO: 2.7 10E3/UL (ref 0.8–5.3)
LYMPHOCYTES NFR BLD AUTO: 14 %
MCH RBC QN AUTO: 30.4 PG (ref 26.5–33)
MCHC RBC AUTO-ENTMCNC: 33.6 G/DL (ref 31.5–36.5)
MCV RBC AUTO: 91 FL (ref 78–100)
MONOCYTES # BLD AUTO: 1.4 10E3/UL (ref 0–1.3)
MONOCYTES NFR BLD AUTO: 7 %
NEUTROPHILS # BLD AUTO: 14.9 10E3/UL (ref 1.6–8.3)
NEUTROPHILS NFR BLD AUTO: 78 %
NITRATE UR QL: NEGATIVE
NRBC # BLD AUTO: 0 10E3/UL
NRBC BLD AUTO-RTO: 0 /100
PH UR STRIP: 8 [PH] (ref 5–7)
PLATELET # BLD AUTO: 340 10E3/UL (ref 150–450)
POTASSIUM SERPL-SCNC: 3.4 MMOL/L (ref 3.4–5.3)
PROT SERPL-MCNC: 6.8 G/DL (ref 6.4–8.3)
RADIOLOGIST FLAGS: ABNORMAL
RBC # BLD AUTO: 4.11 10E6/UL (ref 3.8–5.2)
RBC URINE: 2 /HPF
SODIUM SERPL-SCNC: 138 MMOL/L (ref 135–145)
SP GR UR STRIP: 1.02 (ref 1–1.03)
SPECIMEN EXP DATE BLD: NORMAL
UROBILINOGEN UR STRIP-MCNC: NORMAL MG/DL
WBC # BLD AUTO: 19.1 10E3/UL (ref 4–11)
WBC URINE: 4 /HPF

## 2025-06-03 PROCEDURE — 370N000017 HC ANESTHESIA TECHNICAL FEE, PER MIN: Performed by: STUDENT IN AN ORGANIZED HEALTH CARE EDUCATION/TRAINING PROGRAM

## 2025-06-03 PROCEDURE — 36415 COLL VENOUS BLD VENIPUNCTURE: CPT | Performed by: EMERGENCY MEDICINE

## 2025-06-03 PROCEDURE — 250N000009 HC RX 250: Performed by: NURSE ANESTHETIST, CERTIFIED REGISTERED

## 2025-06-03 PROCEDURE — 81003 URINALYSIS AUTO W/O SCOPE: CPT | Performed by: EMERGENCY MEDICINE

## 2025-06-03 PROCEDURE — 272N000001 HC OR GENERAL SUPPLY STERILE: Performed by: STUDENT IN AN ORGANIZED HEALTH CARE EDUCATION/TRAINING PROGRAM

## 2025-06-03 PROCEDURE — 76830 TRANSVAGINAL US NON-OB: CPT | Mod: 26 | Performed by: RADIOLOGY

## 2025-06-03 PROCEDURE — 999N000141 HC STATISTIC PRE-PROCEDURE NURSING ASSESSMENT: Performed by: STUDENT IN AN ORGANIZED HEALTH CARE EDUCATION/TRAINING PROGRAM

## 2025-06-03 PROCEDURE — 250N000011 HC RX IP 250 OP 636: Performed by: STUDENT IN AN ORGANIZED HEALTH CARE EDUCATION/TRAINING PROGRAM

## 2025-06-03 PROCEDURE — 80053 COMPREHEN METABOLIC PANEL: CPT | Performed by: EMERGENCY MEDICINE

## 2025-06-03 PROCEDURE — 58662 LAPAROSCOPY EXCISE LESIONS: CPT | Mod: GC | Performed by: STUDENT IN AN ORGANIZED HEALTH CARE EDUCATION/TRAINING PROGRAM

## 2025-06-03 PROCEDURE — 710N000010 HC RECOVERY PHASE 1, LEVEL 2, PER MIN: Performed by: STUDENT IN AN ORGANIZED HEALTH CARE EDUCATION/TRAINING PROGRAM

## 2025-06-03 PROCEDURE — 250N000011 HC RX IP 250 OP 636: Mod: JZ | Performed by: EMERGENCY MEDICINE

## 2025-06-03 PROCEDURE — 96374 THER/PROPH/DIAG INJ IV PUSH: CPT | Performed by: EMERGENCY MEDICINE

## 2025-06-03 PROCEDURE — 250N000011 HC RX IP 250 OP 636: Performed by: EMERGENCY MEDICINE

## 2025-06-03 PROCEDURE — 99244 OFF/OP CNSLTJ NEW/EST MOD 40: CPT | Mod: 57 | Performed by: STUDENT IN AN ORGANIZED HEALTH CARE EDUCATION/TRAINING PROGRAM

## 2025-06-03 PROCEDURE — 258N000003 HC RX IP 258 OP 636: Performed by: NURSE ANESTHETIST, CERTIFIED REGISTERED

## 2025-06-03 PROCEDURE — 250N000013 HC RX MED GY IP 250 OP 250 PS 637: Performed by: STUDENT IN AN ORGANIZED HEALTH CARE EDUCATION/TRAINING PROGRAM

## 2025-06-03 PROCEDURE — 83690 ASSAY OF LIPASE: CPT | Performed by: EMERGENCY MEDICINE

## 2025-06-03 PROCEDURE — 93976 VASCULAR STUDY: CPT | Mod: XU

## 2025-06-03 PROCEDURE — 81001 URINALYSIS AUTO W/SCOPE: CPT | Performed by: EMERGENCY MEDICINE

## 2025-06-03 PROCEDURE — 84703 CHORIONIC GONADOTROPIN ASSAY: CPT | Performed by: EMERGENCY MEDICINE

## 2025-06-03 PROCEDURE — 250N000011 HC RX IP 250 OP 636: Performed by: ANESTHESIOLOGY

## 2025-06-03 PROCEDURE — 258N000001 HC RX 258: Performed by: STUDENT IN AN ORGANIZED HEALTH CARE EDUCATION/TRAINING PROGRAM

## 2025-06-03 PROCEDURE — 250N000011 HC RX IP 250 OP 636: Performed by: NURSE ANESTHETIST, CERTIFIED REGISTERED

## 2025-06-03 PROCEDURE — 88305 TISSUE EXAM BY PATHOLOGIST: CPT | Mod: TC | Performed by: STUDENT IN AN ORGANIZED HEALTH CARE EDUCATION/TRAINING PROGRAM

## 2025-06-03 PROCEDURE — 120N000002 HC R&B MED SURG/OB UMMC

## 2025-06-03 PROCEDURE — 86901 BLOOD TYPING SEROLOGIC RH(D): CPT | Performed by: EMERGENCY MEDICINE

## 2025-06-03 PROCEDURE — 76856 US EXAM PELVIC COMPLETE: CPT | Mod: 26 | Performed by: RADIOLOGY

## 2025-06-03 PROCEDURE — 58662 LAPAROSCOPY EXCISE LESIONS: CPT | Mod: 81 | Performed by: STUDENT IN AN ORGANIZED HEALTH CARE EDUCATION/TRAINING PROGRAM

## 2025-06-03 PROCEDURE — 99291 CRITICAL CARE FIRST HOUR: CPT | Performed by: EMERGENCY MEDICINE

## 2025-06-03 PROCEDURE — 85025 COMPLETE CBC W/AUTO DIFF WBC: CPT | Performed by: EMERGENCY MEDICINE

## 2025-06-03 PROCEDURE — 710N000012 HC RECOVERY PHASE 2, PER MINUTE: Performed by: STUDENT IN AN ORGANIZED HEALTH CARE EDUCATION/TRAINING PROGRAM

## 2025-06-03 PROCEDURE — 250N000025 HC SEVOFLURANE, PER MIN: Performed by: STUDENT IN AN ORGANIZED HEALTH CARE EDUCATION/TRAINING PROGRAM

## 2025-06-03 PROCEDURE — 99285 EMERGENCY DEPT VISIT HI MDM: CPT | Mod: 25 | Performed by: EMERGENCY MEDICINE

## 2025-06-03 PROCEDURE — 360N000077 HC SURGERY LEVEL 4, PER MIN: Performed by: STUDENT IN AN ORGANIZED HEALTH CARE EDUCATION/TRAINING PROGRAM

## 2025-06-03 RX ORDER — HYDROXYZINE HYDROCHLORIDE 25 MG/1
25 TABLET, FILM COATED ORAL
Status: DISCONTINUED | OUTPATIENT
Start: 2025-06-03 | End: 2025-06-03 | Stop reason: HOSPADM

## 2025-06-03 RX ORDER — HYDROMORPHONE HYDROCHLORIDE 1 MG/ML
0.5 INJECTION, SOLUTION INTRAMUSCULAR; INTRAVENOUS; SUBCUTANEOUS ONCE
Refills: 0 | Status: COMPLETED | OUTPATIENT
Start: 2025-06-03 | End: 2025-06-03

## 2025-06-03 RX ORDER — ONDANSETRON 2 MG/ML
INJECTION INTRAMUSCULAR; INTRAVENOUS PRN
Status: DISCONTINUED | OUTPATIENT
Start: 2025-06-03 | End: 2025-06-03

## 2025-06-03 RX ORDER — ACETAMINOPHEN 325 MG/1
975 TABLET ORAL ONCE
Status: COMPLETED | OUTPATIENT
Start: 2025-06-03 | End: 2025-06-03

## 2025-06-03 RX ORDER — FENTANYL CITRATE 50 UG/ML
50 INJECTION, SOLUTION INTRAMUSCULAR; INTRAVENOUS EVERY 5 MIN PRN
Status: DISCONTINUED | OUTPATIENT
Start: 2025-06-03 | End: 2025-06-03 | Stop reason: HOSPADM

## 2025-06-03 RX ORDER — KETAMINE HYDROCHLORIDE 10 MG/ML
INJECTION INTRAMUSCULAR; INTRAVENOUS PRN
Status: DISCONTINUED | OUTPATIENT
Start: 2025-06-03 | End: 2025-06-03

## 2025-06-03 RX ORDER — ONDANSETRON 4 MG/1
4 TABLET, ORALLY DISINTEGRATING ORAL EVERY 30 MIN PRN
Status: DISCONTINUED | OUTPATIENT
Start: 2025-06-03 | End: 2025-06-03 | Stop reason: HOSPADM

## 2025-06-03 RX ORDER — PROPOFOL 10 MG/ML
INJECTION, EMULSION INTRAVENOUS PRN
Status: DISCONTINUED | OUTPATIENT
Start: 2025-06-03 | End: 2025-06-03

## 2025-06-03 RX ORDER — ACETAMINOPHEN 325 MG/1
975 TABLET ORAL EVERY 6 HOURS PRN
Qty: 50 TABLET | Refills: 0 | Status: SHIPPED | OUTPATIENT
Start: 2025-06-03

## 2025-06-03 RX ORDER — PROPOFOL 10 MG/ML
INJECTION, EMULSION INTRAVENOUS CONTINUOUS PRN
Status: DISCONTINUED | OUTPATIENT
Start: 2025-06-03 | End: 2025-06-03

## 2025-06-03 RX ORDER — BUPIVACAINE HYDROCHLORIDE 2.5 MG/ML
INJECTION, SOLUTION INFILTRATION; PERINEURAL PRN
Status: DISCONTINUED | OUTPATIENT
Start: 2025-06-03 | End: 2025-06-03 | Stop reason: HOSPADM

## 2025-06-03 RX ORDER — HYDROMORPHONE HYDROCHLORIDE 1 MG/ML
0.2 INJECTION, SOLUTION INTRAMUSCULAR; INTRAVENOUS; SUBCUTANEOUS EVERY 5 MIN PRN
Status: DISCONTINUED | OUTPATIENT
Start: 2025-06-03 | End: 2025-06-03 | Stop reason: HOSPADM

## 2025-06-03 RX ORDER — HYDROMORPHONE HYDROCHLORIDE 1 MG/ML
0.4 INJECTION, SOLUTION INTRAMUSCULAR; INTRAVENOUS; SUBCUTANEOUS EVERY 5 MIN PRN
Status: DISCONTINUED | OUTPATIENT
Start: 2025-06-03 | End: 2025-06-03 | Stop reason: HOSPADM

## 2025-06-03 RX ORDER — OXYCODONE HYDROCHLORIDE 5 MG/1
5-10 TABLET ORAL EVERY 4 HOURS PRN
Qty: 6 TABLET | Refills: 0 | Status: SHIPPED | OUTPATIENT
Start: 2025-06-03

## 2025-06-03 RX ORDER — HYDROMORPHONE HYDROCHLORIDE 1 MG/ML
0.5 INJECTION, SOLUTION INTRAMUSCULAR; INTRAVENOUS; SUBCUTANEOUS EVERY 30 MIN PRN
Refills: 0 | Status: DISCONTINUED | OUTPATIENT
Start: 2025-06-03 | End: 2025-06-03 | Stop reason: HOSPADM

## 2025-06-03 RX ORDER — HYDROXYZINE HYDROCHLORIDE 10 MG/1
10 TABLET, FILM COATED ORAL
Status: DISCONTINUED | OUTPATIENT
Start: 2025-06-03 | End: 2025-06-03 | Stop reason: HOSPADM

## 2025-06-03 RX ORDER — ONDANSETRON 2 MG/ML
4 INJECTION INTRAMUSCULAR; INTRAVENOUS EVERY 30 MIN PRN
Status: DISCONTINUED | OUTPATIENT
Start: 2025-06-03 | End: 2025-06-03 | Stop reason: HOSPADM

## 2025-06-03 RX ORDER — PREDNISONE 5 MG/1
5 TABLET ORAL DAILY
COMMUNITY
Start: 2025-05-12

## 2025-06-03 RX ORDER — ACETAMINOPHEN 325 MG/1
975 TABLET ORAL ONCE
Status: DISCONTINUED | OUTPATIENT
Start: 2025-06-03 | End: 2025-06-03 | Stop reason: HOSPADM

## 2025-06-03 RX ORDER — FENTANYL CITRATE 50 UG/ML
INJECTION, SOLUTION INTRAMUSCULAR; INTRAVENOUS PRN
Status: DISCONTINUED | OUTPATIENT
Start: 2025-06-03 | End: 2025-06-03

## 2025-06-03 RX ORDER — SODIUM CHLORIDE, SODIUM LACTATE, POTASSIUM CHLORIDE, CALCIUM CHLORIDE 600; 310; 30; 20 MG/100ML; MG/100ML; MG/100ML; MG/100ML
INJECTION, SOLUTION INTRAVENOUS CONTINUOUS
Status: DISCONTINUED | OUTPATIENT
Start: 2025-06-03 | End: 2025-06-03 | Stop reason: HOSPADM

## 2025-06-03 RX ORDER — OXYCODONE HYDROCHLORIDE 5 MG/1
5 TABLET ORAL
Status: DISCONTINUED | OUTPATIENT
Start: 2025-06-03 | End: 2025-06-03 | Stop reason: HOSPADM

## 2025-06-03 RX ORDER — HYDROXYCHLOROQUINE SULFATE 200 MG/1
200 TABLET, FILM COATED ORAL 2 TIMES DAILY
COMMUNITY
Start: 2025-05-19

## 2025-06-03 RX ORDER — SODIUM CHLORIDE, SODIUM LACTATE, POTASSIUM CHLORIDE, CALCIUM CHLORIDE 600; 310; 30; 20 MG/100ML; MG/100ML; MG/100ML; MG/100ML
INJECTION, SOLUTION INTRAVENOUS CONTINUOUS PRN
Status: DISCONTINUED | OUTPATIENT
Start: 2025-06-03 | End: 2025-06-03

## 2025-06-03 RX ORDER — ONDANSETRON 2 MG/ML
4 INJECTION INTRAMUSCULAR; INTRAVENOUS ONCE
Status: COMPLETED | OUTPATIENT
Start: 2025-06-03 | End: 2025-06-03

## 2025-06-03 RX ORDER — IBUPROFEN 800 MG/1
800 TABLET, FILM COATED ORAL EVERY 6 HOURS PRN
Qty: 30 TABLET | Refills: 0 | Status: SHIPPED | OUTPATIENT
Start: 2025-06-03

## 2025-06-03 RX ORDER — LIDOCAINE HYDROCHLORIDE 20 MG/ML
INJECTION, SOLUTION INFILTRATION; PERINEURAL PRN
Status: DISCONTINUED | OUTPATIENT
Start: 2025-06-03 | End: 2025-06-03

## 2025-06-03 RX ORDER — FENTANYL CITRATE 50 UG/ML
25 INJECTION, SOLUTION INTRAMUSCULAR; INTRAVENOUS EVERY 5 MIN PRN
Status: DISCONTINUED | OUTPATIENT
Start: 2025-06-03 | End: 2025-06-03 | Stop reason: HOSPADM

## 2025-06-03 RX ORDER — AMOXICILLIN 250 MG
1-2 CAPSULE ORAL 2 TIMES DAILY PRN
Qty: 30 TABLET | Refills: 0 | Status: SHIPPED | OUTPATIENT
Start: 2025-06-03

## 2025-06-03 RX ORDER — NALOXONE HYDROCHLORIDE 0.4 MG/ML
0.1 INJECTION, SOLUTION INTRAMUSCULAR; INTRAVENOUS; SUBCUTANEOUS
Status: DISCONTINUED | OUTPATIENT
Start: 2025-06-03 | End: 2025-06-03 | Stop reason: HOSPADM

## 2025-06-03 RX ORDER — DEXAMETHASONE SODIUM PHOSPHATE 4 MG/ML
INJECTION, SOLUTION INTRA-ARTICULAR; INTRALESIONAL; INTRAMUSCULAR; INTRAVENOUS; SOFT TISSUE PRN
Status: DISCONTINUED | OUTPATIENT
Start: 2025-06-03 | End: 2025-06-03

## 2025-06-03 RX ORDER — DEXAMETHASONE SODIUM PHOSPHATE 4 MG/ML
4 INJECTION, SOLUTION INTRA-ARTICULAR; INTRALESIONAL; INTRAMUSCULAR; INTRAVENOUS; SOFT TISSUE
Status: DISCONTINUED | OUTPATIENT
Start: 2025-06-03 | End: 2025-06-03 | Stop reason: HOSPADM

## 2025-06-03 RX ADMIN — HYDROMORPHONE HYDROCHLORIDE 0.5 MG: 1 INJECTION, SOLUTION INTRAMUSCULAR; INTRAVENOUS; SUBCUTANEOUS at 02:29

## 2025-06-03 RX ADMIN — HYDROMORPHONE HYDROCHLORIDE 0.5 MG: 1 INJECTION, SOLUTION INTRAMUSCULAR; INTRAVENOUS; SUBCUTANEOUS at 02:50

## 2025-06-03 RX ADMIN — ACETAMINOPHEN 975 MG: 325 TABLET ORAL at 05:52

## 2025-06-03 RX ADMIN — SUGAMMADEX 200 MG: 100 INJECTION, SOLUTION INTRAVENOUS at 07:31

## 2025-06-03 RX ADMIN — ONDANSETRON 4 MG: 2 INJECTION INTRAMUSCULAR; INTRAVENOUS at 07:24

## 2025-06-03 RX ADMIN — ONDANSETRON 4 MG: 2 INJECTION INTRAMUSCULAR; INTRAVENOUS at 08:45

## 2025-06-03 RX ADMIN — PHENYLEPHRINE HYDROCHLORIDE 200 MCG: 10 INJECTION INTRAVENOUS at 06:28

## 2025-06-03 RX ADMIN — DEXAMETHASONE SODIUM PHOSPHATE 8 MG: 4 INJECTION, SOLUTION INTRAMUSCULAR; INTRAVENOUS at 06:28

## 2025-06-03 RX ADMIN — PROPOFOL 150 MCG/KG/MIN: 10 INJECTION, EMULSION INTRAVENOUS at 06:34

## 2025-06-03 RX ADMIN — HYDROMORPHONE HYDROCHLORIDE 0.5 MG: 1 INJECTION, SOLUTION INTRAMUSCULAR; INTRAVENOUS; SUBCUTANEOUS at 05:06

## 2025-06-03 RX ADMIN — LIDOCAINE HYDROCHLORIDE 80 MG: 20 INJECTION, SOLUTION INFILTRATION; PERINEURAL at 06:23

## 2025-06-03 RX ADMIN — PROPOFOL 160 MG: 10 INJECTION, EMULSION INTRAVENOUS at 06:23

## 2025-06-03 RX ADMIN — ONDANSETRON 4 MG: 2 INJECTION, SOLUTION INTRAMUSCULAR; INTRAVENOUS at 02:47

## 2025-06-03 RX ADMIN — Medication 30 MG: at 06:23

## 2025-06-03 RX ADMIN — Medication 50 MG: at 06:23

## 2025-06-03 RX ADMIN — Medication 20 MG: at 06:39

## 2025-06-03 RX ADMIN — FENTANYL CITRATE 100 MCG: 50 INJECTION INTRAMUSCULAR; INTRAVENOUS at 06:23

## 2025-06-03 RX ADMIN — MIDAZOLAM 2 MG: 1 INJECTION INTRAMUSCULAR; INTRAVENOUS at 06:16

## 2025-06-03 RX ADMIN — SODIUM CHLORIDE, SODIUM LACTATE, POTASSIUM CHLORIDE, AND CALCIUM CHLORIDE: .6; .31; .03; .02 INJECTION, SOLUTION INTRAVENOUS at 06:16

## 2025-06-03 ASSESSMENT — ACTIVITIES OF DAILY LIVING (ADL)
ADLS_ACUITY_SCORE: 41

## 2025-06-03 ASSESSMENT — COLUMBIA-SUICIDE SEVERITY RATING SCALE - C-SSRS
1. IN THE PAST MONTH, HAVE YOU WISHED YOU WERE DEAD OR WISHED YOU COULD GO TO SLEEP AND NOT WAKE UP?: NO
2. HAVE YOU ACTUALLY HAD ANY THOUGHTS OF KILLING YOURSELF IN THE PAST MONTH?: NO
6. HAVE YOU EVER DONE ANYTHING, STARTED TO DO ANYTHING, OR PREPARED TO DO ANYTHING TO END YOUR LIFE?: NO

## 2025-06-03 NOTE — ED NOTES
Bed: ED06  Expected date: 6/3/25  Expected time: 4:04 AM  Means of arrival:   Comments:  EB ovarian torsion

## 2025-06-03 NOTE — ED PROVIDER NOTES
The patient was accepted in transfer from the [place] ED over to the [facility] ED to see gynecology on an emergent basis.  She does have findings concerning for ovarian torsion.  The gynecology team did see the patient, do feel she does have torsion, will plan to take her urgently to the operating room for definitive management.    Dictation Disclaimer: Some of this Note has been completed with voice-recognition dictation software. Although errors are generally corrected real-time, there is the potential for a rare error to be present in the completed chart.       Rosey Harkins MD  06/03/25 0560

## 2025-06-03 NOTE — ED NOTES
Pt is scheduled for laparoscopic oophorectomy. Consent signed by the patient. Last full meal was 8:30 pm yesterday (6/2/2025), last water was around 12 midnight. Pt alert and oriented, endorses mild to moderate pain, medicine given,

## 2025-06-03 NOTE — ANESTHESIA CARE TRANSFER NOTE
Patient: Arabella Lawson    Procedure: Procedure(s):  Exam Under Anesthesia, Laparoscopy diagnostic, Left Ovarian Cystectomy  Exam under anesthesia pelvic  Laparoscopic cystectomy ovarian (oncology)       Diagnosis: Ovarian torsion [N83.519]  Diagnosis Additional Information: No value filed.    Anesthesia Type:   General     Note:    Oropharynx: oropharynx clear of all foreign objects  Level of Consciousness: awake  Oxygen Supplementation: face mask    Independent Airway: airway patency satisfactory and stable  Dentition: dentition unchanged  Vital Signs Stable: post-procedure vital signs reviewed and stable  Report to RN Given: handoff report given  Patient transferred to: PACU    Handoff Report: Identifed the Patient, Identified the Reponsible Provider, Reviewed the pertinent medical history, Discussed the surgical course, Reviewed Intra-OP anesthesia mangement and issues during anesthesia, Set expectations for post-procedure period and Allowed opportunity for questions and acknowledgement of understanding      Vitals:  Vitals Value Taken Time   BP 99/61 06/03/25 07:46   Temp 36.3  C (97.3  F) 06/03/25 07:46   Pulse 83 06/03/25 07:58   Resp 14 06/03/25 07:58   SpO2 100 % 06/03/25 07:58   Vitals shown include unfiled device data.    Electronically Signed By: MARYANN Rice CRNA  Luna 3, 2025  7:59 AM

## 2025-06-03 NOTE — ANESTHESIA POSTPROCEDURE EVALUATION
Patient: Arabella Lawson    Procedure: Procedure(s):  Exam Under Anesthesia, Laparoscopy diagnostic, Left Ovarian Cystectomy  Exam under anesthesia pelvic  Laparoscopic cystectomy ovarian (oncology)       Anesthesia Type:  General    Note:  Disposition: Outpatient   Postop Pain Control: Uneventful            Sign Out: Well controlled pain   PONV:    Neuro/Psych: Uneventful            Sign Out: Acceptable/Baseline neuro status   Airway/Respiratory: Uneventful            Sign Out: Acceptable/Baseline resp. status   CV/Hemodynamics: Uneventful            Sign Out: Acceptable CV status; No obvious hypovolemia; No obvious fluid overload   Other NRE: NONE   DID A NON-ROUTINE EVENT OCCUR?            Last vitals:  Vitals Value Taken Time   BP 96/73 06/03/25 08:00   Temp 36.3  C (97.3  F) 06/03/25 07:46   Pulse 87 06/03/25 08:08   Resp 14 06/03/25 08:08   SpO2 99 % 06/03/25 08:08   Vitals shown include unfiled device data.    Electronically Signed By: Sara Reddy MD  Luna 3, 2025  8:09 AM

## 2025-06-03 NOTE — ANESTHESIA PROCEDURE NOTES
Airway       Patient location during procedure: OR       Procedure Start/Stop Times: 6/3/2025 6:26 AM  Staff -        CRNA: Rosey Madden APRN CRNA       Performed By: CRNA  Consent for Airway        Urgency: elective  Indications and Patient Condition       Indications for airway management: parish-procedural       Induction type:intravenous       Mask difficulty assessment: 1 - vent by mask    Final Airway Details       Final airway type: endotracheal airway       Successful airway: ETT - single and Oral  Endotracheal Airway Details        ETT size (mm): 7.0       Cuffed: yes       Cuff volume (mL): 4       Successful intubation technique: video laryngoscopy       VL Blade Size: MAC 3       Grade View of Cords: 1       Adjucts: stylet       Position: Right       Measured from: lips       Secured at (cm): 22       Bite block used: None    Post intubation assessment        Placement verified by: capnometry, equal breath sounds and chest rise        Number of attempts at approach: 1       Number of other approaches attempted: 0       Secured with: tape       Ease of procedure: easy       Dentition: Intact and Unchanged    Medication(s) Administered   Medication Administration Time: 6/3/2025 6:26 AM

## 2025-06-03 NOTE — ED NOTES
Pt walked to stretcher, stated pain is much improved. EMS taking pt in stretcher with lights and sirens to [facility].

## 2025-06-03 NOTE — ED PROVIDER NOTES
ED Provider Note  [facility]      History     Chief Complaint   Patient presents with    Pelvic Pain     Pt here for L sided pelvic pain that started at 2230. Took Ibuprofen and  Norco with no relief, EMS gave 15 of Ketorolac. Pt has a hx of ovarian cyst, rheumatoid arthritis and depression.  Pt BIBA writhing in pain.   Pt states also having light spotting, has IUD. Denies urinary symptoms.      HPI  Arabella Lawson is a 24 year old female who is the ED for evaluation of left lower quadrant abdominal pain.  States the pain started acutely at 1030.  It is severe and left located in the left lower quadrant/left pelvic area.  Denies any vaginal bleeding or discharge.  Has been urinating and stooling without issue.  Denies any fevers or chills.  Has a history of rheumatoid arthritis.  Of note, patient states she was in the ED 1 month ago with similar symptoms but not nearly as intense.  At that time she was diagnosed with an ovarian cyst.    Past Medical History  Past Medical History:   Diagnosis Date    Asthma     Bipolar depression (H)      Past Surgical History:   Procedure Laterality Date    TONSILLECTOMY       amphetamine-dextroamphetamine (ADDERALL) 20 MG tablet  lamoTRIgine (LAMICTAL) 100 MG tablet  LATUDA 20 MG TABS tablet  levonorgestrel (MIRENA) 52 MG (20 mcg/day) IUD  loratadine (CLARITIN) 10 MG tablet  metoprolol tartrate (LOPRESSOR) 25 MG tablet  sodium chloride 0.9 % neb solution      No Known Allergies  Family History  Family History   Problem Relation Age of Onset    Menstrual problems Mother     Menstrual problems Sister     Glaucoma No family hx of     Macular Degeneration No family hx of      Social History   Social History     Tobacco Use    Smoking status: Never    Smokeless tobacco: Never   Substance Use Topics    Alcohol use: Never    Drug use: Never      A medically appropriate review of systems was performed with pertinent positives and negatives noted in the HPI,  "and all other systems negative.    Physical Exam   BP: 108/61  Pulse: 85  Temp: 97.5  F (36.4  C)  Resp: 18  Height: 162.6 cm (5' 4\")  Weight: 77.1 kg (170 lb)  SpO2: 100 %  Physical Exam  Vitals and nursing note reviewed.   Constitutional:       General: She is in acute distress.      Appearance: Normal appearance.      Comments: Patient writhing in pain   HENT:      Head: Normocephalic.      Nose: Nose normal.   Eyes:      Pupils: Pupils are equal, round, and reactive to light.   Cardiovascular:      Rate and Rhythm: Normal rate and regular rhythm.   Pulmonary:      Effort: Pulmonary effort is normal.   Abdominal:      General: There is no distension.          Comments: Severe pain with associated tenderness in the left lower quadrant/left pelvic area.  Abdomen is otherwise soft and nondistended.   Musculoskeletal:         General: No deformity. Normal range of motion.      Cervical back: Normal range of motion.   Skin:     General: Skin is warm.   Neurological:      Mental Status: She is alert and oriented to person, place, and time.   Psychiatric:         Mood and Affect: Mood normal.           ED Course, Procedures, & Data     ED Course as of 06/03/25 0409   e Jun 03, 2025   [id number] OB/GYN staff paged [phone] Spoke w/ OB staff.  Arranging Emergent transfer to [facility] for operative interventnion        Results for orders placed or performed during the hospital encounter of 06/03/25   US Pelvis Cmplt w Transvag & Doppler LmtPel Duplex Limited     Status: Abnormal   Result Value Ref Range    Radiologist flags Ovarian or testicular torsion (AA)     Narrative    EXAM: US PELVIS COMPLETE W TRANSVAGINAL AND DOPPLER LIMITED  LOCATION: [facility]  DATE: 6/3/2025    INDICATION: Severe L pelvic pain, hx ovarian cyst, c f torsion  COMPARISON: Pelvic ultrasound 05/05/2025.  TECHNIQUE: Transabdominal scans were performed. Endovaginal ultrasound was performed to better " visualize the adnexa. Color flow with spectral Doppler and waveform analysis performed.    FINDINGS:    UTERUS: 6.2 x 3.1 x 3.8 cm. Normal in size and position with no masses. Intrauterine device in place.    ENDOMETRIUM: 3 mm. Normal smooth endometrium.    RIGHT OVARY: 3.9 x 2.1 x 2.3 cm. Normal with arterial and venous duplex flow identified. Multiple small cysts/follicles incidentally noted.    LEFT OVARY: Enlarged left ovary measuring 4.8 x 4.6 x 5.6 cm. Left ovary contains multiple cysts measuring up to 5.1 cm. The ovary appears hypovascular without definite arterial flow/waveform visualized.    Small pelvic free fluid.      Impression    IMPRESSION:    1.  Left ovary is enlarged measuring up to 5.6 cm and appears hypovascular without definite arterial flow/waveform visualized. Findings highly suspicious for left ovarian torsion. Multiple cysts seen within the left ovary measuring up to 5.1 cm. Small   pelvic free fluid.   2.  Recommend gynecology consultation.        [Critical Result: Ovarian or testicular torsion]    Finding was identified on 6/3/2025 3:52 AM CDT.     Dr. Puente was contacted by me on 6/3/2025 4:04 AM CDT and verbalized understanding of the critical result.       [place] Draw     Status: None (In process)    Narrative    The following orders were created for panel order [place] Draw.  Procedure                               Abnormality         Status                     ---------                               -----------         ------                     Extra Red Top Tube[[id number]]                              In process                 Extra Green Top (Lithiu...[[id number]]                      In process                 Extra Purple Top Tube[[id number]]                           In process                   Please view results for these tests on the individual orders.   Comprehensive metabolic panel     Status: Abnormal   Result Value Ref Range    Sodium 138 135 - 145 mmol/L    Potassium  3.4 3.4 - 5.3 mmol/L    Carbon Dioxide (CO2) 23 22 - 29 mmol/L    Anion Gap 14 7 - 15 mmol/L    Urea Nitrogen 11.0 6.0 - 20.0 mg/dL    Creatinine 0.65 0.51 - 0.95 mg/dL    GFR Estimate >90 >60 mL/min/1.73m2    Calcium 9.3 8.8 - 10.4 mg/dL    Chloride 101 98 - 107 mmol/L    Glucose 129 (H) 70 - 99 mg/dL    Alkaline Phosphatase 71 40 - 150 U/L    AST 21 0 - 45 U/L    ALT 19 0 - 50 U/L    Protein Total 6.8 6.4 - 8.3 g/dL    Albumin 4.5 3.5 - 5.2 g/dL    Bilirubin Total 0.2 <=1.2 mg/dL   HCG qualitative Blood     Status: Normal   Result Value Ref Range    hCG Serum Qualitative Negative Negative   Lipase     Status: Normal   Result Value Ref Range    Lipase 16 13 - 60 U/L   CBC with platelets and differential     Status: Abnormal   Result Value Ref Range    WBC Count 19.1 (H) 4.0 - 11.0 10e3/uL    RBC Count 4.11 3.80 - 5.20 10e6/uL    Hemoglobin 12.5 11.7 - 15.7 g/dL    Hematocrit 37.2 35.0 - 47.0 %    MCV 91 78 - 100 fL    MCH 30.4 26.5 - 33.0 pg    MCHC 33.6 31.5 - 36.5 g/dL    RDW 11.2 10.0 - 15.0 %    Platelet Count 340 150 - 450 10e3/uL    % Neutrophils 78 %    % Lymphocytes 14 %    % Monocytes 7 %    % Eosinophils 0 %    % Basophils 0 %    % Immature Granulocytes 0 %    NRBCs per 100 WBC 0 <1 /100    Absolute Neutrophils 14.9 (H) 1.6 - 8.3 10e3/uL    Absolute Lymphocytes 2.7 0.8 - 5.3 10e3/uL    Absolute Monocytes 1.4 (H) 0.0 - 1.3 10e3/uL    Absolute Eosinophils 0.0 0.0 - 0.7 10e3/uL    Absolute Basophils 0.0 0.0 - 0.2 10e3/uL    Absolute Immature Granulocytes 0.1 <=0.4 10e3/uL    Absolute NRBCs 0.0 10e3/uL   CBC with platelets differential *Canceled*     Status: None ()    Narrative    The following orders were created for panel order CBC with platelets differential.  Procedure                               Abnormality         Status                     ---------                               -----------         ------                       Please view results for these tests on the individual orders.   CBC with  platelets differential     Status: Abnormal    Narrative    The following orders were created for panel order CBC with platelets differential.  Procedure                               Abnormality         Status                     ---------                               -----------         ------                     CBC with platelets and ...[[id number]]  Abnormal            Final result                 Please view results for these tests on the individual orders.     Medications   HYDROmorphone (PF) (DILAUDID) injection 0.5 mg (0.5 mg Intravenous $Given 6/3/25 6489)   ondansetron (ZOFRAN) injection 4 mg (4 mg Intravenous $Given 6/3/25 0247)   HYDROmorphone (PF) (DILAUDID) injection 0.5 mg (0.5 mg Intravenous $Given 6/3/25 0250)     Labs Ordered and Resulted from Time of ED Arrival to Time of ED Departure   COMPREHENSIVE METABOLIC PANEL - Abnormal       Result Value    Sodium 138      Potassium 3.4      Carbon Dioxide (CO2) 23      Anion Gap 14      Urea Nitrogen 11.0      Creatinine 0.65      GFR Estimate >90      Calcium 9.3      Chloride 101      Glucose 129 (*)     Alkaline Phosphatase 71      AST 21      ALT 19      Protein Total 6.8      Albumin 4.5      Bilirubin Total 0.2     CBC WITH PLATELETS AND DIFFERENTIAL - Abnormal    WBC Count 19.1 (*)     RBC Count 4.11      Hemoglobin 12.5      Hematocrit 37.2      MCV 91      MCH 30.4      MCHC 33.6      RDW 11.2      Platelet Count 340      % Neutrophils 78      % Lymphocytes 14      % Monocytes 7      % Eosinophils 0      % Basophils 0      % Immature Granulocytes 0      NRBCs per 100 WBC 0      Absolute Neutrophils 14.9 (*)     Absolute Lymphocytes 2.7      Absolute Monocytes 1.4 (*)     Absolute Eosinophils 0.0      Absolute Basophils 0.0      Absolute Immature Granulocytes 0.1      Absolute NRBCs 0.0     HCG QUALITATIVE PREGNANCY - Normal    hCG Serum Qualitative Negative     LIPASE - Normal    Lipase 16     ROUTINE UA WITH MICROSCOPIC REFLEX TO CULTURE    ABO/RH TYPE AND SCREEN     US Pelvis Cmplt w Transvag & Doppler LmtPel Duplex Limited   Final Result   Abnormal   IMPRESSION:     1.  Left ovary is enlarged measuring up to 5.6 cm and appears hypovascular without definite arterial flow/waveform visualized. Findings highly suspicious for left ovarian torsion. Multiple cysts seen within the left ovary measuring up to 5.1 cm. Small    pelvic free fluid.    2.  Recommend gynecology consultation.            [Critical Result: Ovarian or testicular torsion]      Finding was identified on 6/3/2025 3:52 AM CDT.       Dr. Puente was contacted by me on 6/3/2025 4:04 AM CDT and verbalized understanding of the critical result.                   Critical Care Addendum  My initial assessment, based on my review of prehospital provider report, review of nursing observations, review of vital signs, focused history, and physical exam, established a high suspicion that Arabella Lawson has ovarian torsion, which requires immediate intervention, and therefore she is critically ill.     After the initial assessment, the care team initiated multiple lab tests, initiated medication therapy with IV dilaudid / zofran, and consulted with OBGyN to provide stabilization care. Due to the critical nature of this patient, I reassessed nursing observations and physical exam multiple times prior to her disposition.     Time also spent performing documentation, reviewing test results, discussion with consultants, and coordination of care.     Critical care time (excluding teaching time and procedures): 30 minutes.       Assessment & Plan    Ultrasound is concerning for left-sided ovarian torsion.  Case discussed with the OB staff who is in agreement and recommends transfer to the [facility] ED to help expedite transfer to the operating room for definitive management.  Currently arranging emergent ambulance transfer.  Sign out given to [facility] ED.  Type and screen ordered.  Patient is NPO.    I  have reviewed the nursing notes. I have reviewed the findings, diagnosis, plan and need for follow up with the patient.    New Prescriptions    No medications on file       Final diagnoses:   Ovarian torsion       Nathan Puente DO  formerly [facility] EMERGENCY DEPARTMENT  6/3/2025     Nathan Puente DO  06/03/25 0413

## 2025-06-03 NOTE — CONSULTS
[facility]  Gynecology Consult    Arabella Lawson MRN# [id number]   Age: 24 year old YOB: 2000     Date of Admission:  6/3/2025         HPI:   Arabella Lawson is a 24 year old  who presents with acute onset LLQ pain that started around 1030pm while getting ready for bed. She reports one month ago she had a similar pain and was found to have a physiologic ovarian cyst. The pain that started overnight was similar in quality, however worse and did not spontaneously resolve, and was accommodated by nausea and vomiting. She tried taking a Norco she had leftover from a prior prescription and that did not improve the pain, so she presented to the ED. She denies vaginal bleeding, abnormal or malodorous vaginal discharge, dyspareunia, constipation.    Sister and mom with endometriosis. Menses monthly but long and heavy, improved with IUD.     NPO since .        Maternal Past Medical History:     Past Medical History:   Diagnosis Date    Asthma     Bipolar depression (H)    Seronegative Rheumatoid Arthritis, sees Dr. RANGEL (Arthritis and rheum consultant)       Maternal Past Surgical History:     Past Surgical History:   Procedure Laterality Date    TONSILLECTOMY     [place] Teeth          Current Facility-Administered Medications:     HYDROmorphone (PF) (DILAUDID) injection 0.5 mg, 0.5 mg, Intravenous, Q30 Min PRN, Nathan Puente DO, 0.5 mg at 25 0229    Current Outpatient Medications:     amphetamine-dextroamphetamine (ADDERALL) 20 MG tablet, , Disp: , Rfl:     hydroxychloroquine (PLAQUENIL) 200 MG tablet, Take 200 mg by mouth 2 times daily., Disp: , Rfl:     lamoTRIgine (LAMICTAL) 100 MG tablet, Take 100 mg by mouth daily, Disp: , Rfl:     LATUDA 20 MG TABS tablet, TAKE 1 TABLET BY MOUTH DAILY WITH AT LEAST 350 CALORIES OF FOOD, Disp: , Rfl:     levonorgestrel (MIRENA) 52 MG (20 mcg/day) IUD, by Intrauterine route once, Disp: , Rfl:     loratadine (CLARITIN) 10 MG tablet,  "Take 10 mg by mouth daily, Disp: , Rfl:     predniSONE (DELTASONE) 5 MG tablet, Take 5 mg by mouth daily., Disp: , Rfl:     sodium chloride 0.9 % neb solution, 100 count box of 5mL vials for use as directed with medically necessary contacts. No neb needed., Disp: 5 mL, Rfl: 11    Soc Hx: Lives with roommates. No tobacco use. No drug use. Works as a researcher in the epilepsy lab.          Physical Exam:     Vitals:    06/03/25 0220 06/03/25 0232 06/03/25 0338   BP: 108/61  115/66   Pulse: 85     Resp: 18     Temp: 97.5  F (36.4  C)     TempSrc: Oral     SpO2: 100%  99%   Weight:  77.1 kg (170 lb)    Height:  1.626 m (5' 4\")      Gen: Well appearing, mildly uncomfortable appearing after pain medications  Cardio: Extremities warm and well perfused, regular heart rate   Resp: Breathing comfortably on room air  Abdomen: soft, tender over LLQ     Imaging:  Pelvic ultrasound 6/3/25  FINDINGS:  UTERUS: 6.2 x 3.1 x 3.8 cm. Normal in size and position with no masses. Intrauterine device in place.  ENDOMETRIUM: 3 mm. Normal smooth endometrium.  RIGHT OVARY: 3.9 x 2.1 x 2.3 cm. Normal with arterial and venous duplex flow identified. Multiple small cysts/follicles incidentally noted.  LEFT OVARY: Enlarged left ovary measuring 4.8 x 4.6 x 5.6 cm. Left ovary contains multiple cysts measuring up to 5.1 cm. The ovary appears hypovascular without definite arterial flow/waveform visualized.  Small pelvic free fluid.  IMPRESSION:    1.  Left ovary is enlarged measuring up to 5.6 cm and appears hypovascular without definite arterial flow/waveform visualized. Findings highly suspicious for left ovarian torsion. Multiple cysts seen within the left ovary measuring up to 5.1 cm. Small   pelvic free fluid.   2.  Recommend gynecology consultation.        Assessment:   Arabella Lawson is a 24 year old with acute onset LLQ pain in the setting of a complex ovarian cyst suspicious for ovarian torsion.         Plan:     LLQ pain  Reviewed " w/patient that symptoms are consistent with ovarian torsion, and if so, surgery is indicated to detorse the ovary and possibly remove the cyst causing the torsion. Reviewed that occasionally, this cannot be safely accomplished without removing the ovary. Discussed diagnostic laparoscopy with possible detorsion, possible ovarian cystectomy, possible oophorectomy, and reviewed the risks of surgery including infection, bleeding, injury to intraabdominal structures. Patient expressed understanding and signed informed consent was obtained for procedure and for blood transfusion if indicated.  - Recommend OR as cannot rule out ovarian torsion  - Case request placed for EUA, DIAGNOSTIC LAPAROSCOPY, POSSIBLE OVARIAN CYSTECTOMY, POSSIBLE OOPHORECTOMY    Seronegative rheumatoid arthritis  Currently at end of a prednisone burst started as part of evaluation and workup for RA. Was on 15mg for 2 weeks, 10mg for a month, and is now on 5mg. This dosing places patient at intermediate risk for HPA axis suppression, however given that procedure is anticipated to be a minor laparoscopy, stress dose steroids not indicated at this time.   - Continue PTA dosing of prednisone, plaquenil    Anticipate discharge to home after surgery.     Elizabeth Coyle MD  Obstetrics & Gynecology, PGY-3  06/03/2025 5:38 AM     I examined Arabella Lawson with Dr. Coyle and agree with the presentation, exam and plan of care documented in this note with edits by me.   Robina Mills MD

## 2025-06-03 NOTE — OP NOTE
[facility]  Operative Report    Date of Service: 06/03/25  Patient: Arabella Lawson  MRN: [id number]    Pre-operative diagnosis:    - Left lower quadrant pain  - Left ovarian cyst, concern for ovarian torsion    Post-operative diagnosis:    - Left tubo-ovarian torsion     Procedure: EUA, diagnostic laparoscopy, left ovarian cystectomy      Surgeon: Robina Mills MD    Assistants:   - El Ott MD  - Elizabeth Coyle MD, PGY-3    Anesthesia: General  Specimens: Left ovarian cyst wall    EBL: 5ml  Urine: 100ml clear yellow urine  Fluids: 600ml crystalloid  Complications: None   Condition: Stable to PACU    Findings:   On laparoscopy, no signs of trauma on entry. Liver edge, stomach edge, bowel, appendix grossly normal appearing. There are filmy adhesions from the bowel to the left pelvic sidewall, and from the bowel to the right pelvic sidewall. There are several areas consistent with focal endometriosis implants, including a blue-black lesion in the right ovarian fossa and one on the right upper quadrant anterior abdominal wall. The left ovary is enlarged and purple appearing, twisted on the infundibulopelvic ligament two to three times. Left ovarian cyst incised and drained for clear fluid, cyst wall removed in entirety. There are remaining areas of the left ovary that appear consistent with a small endometrioma. Otherwise normal appearing uterus, bilateral fallopian tubes and right ovary. Right ureter visualized to vermiculate, left ureter obscured by adhesive disease.    Indications:   Arabella Lawson is a 24 year old with acute onset LLQ pain in the setting of a complex ovarian cyst suspicious for ovarian torsion. Surgical management was recommended. Risks, benefits, and alternatives to a diagnostic laparoscopy were discussed with the patient who elected to proceed. All questions were answered, the patient demonstrated understanding, and a consent form was obtained as well as  consent for blood transfusion if indicated.    Procedure:   The patient was taken to the operating room where she was placed in the dorsal lithotomy position with feet in yellow fin stirrups. A safety time out was performed.  General anesthesia was administered. An exam under anesthesia was performed. The patient was then prepped and draped in the usual sterile fashion. A irizarry catheter was placed. A sponge stick was placed in the vagina. Attention was then turned to the abdomen where a total of 5 mL of 0.25% bupivicaine plain was used to infiltrate the superior aspect of the umbilicus. An 11-blade scalpel was used to make a 5 mm vertical incision along the superior aspect of the umbilicus. The visiport trocar with 5mm laparoscope was used to access the peritoneum through the umbilical incision with intraabdominal entry confirmed under direct visualization.. CO2 gas was attached to the port and opening pressure was measured at <5mmHg.  Flow was increased until a pressure of 15 mmHg.  Pneumoperitoneum was achieved. The 5 mm scope was placed in the trocar and visualized the abdomen which was free of any injury.  The above mentioned findings were then visualized.  Second and third 5 mm trocars were then placed on right and left side of the abdomen approximately 3cm superomedial to the anterior superior iliac spines.  These were placed with similar technique under direct visualization.  A total of 4 mL of 0.25% bupivicaine plain was injected into the sites.   Attention was then turned to the torsed left ovary, which was easily detorsed. The cyst wall was incised with monopolar scissors and drained for clear fluid, then bluntly dissected out in entirety and sent to pathology. At this time Dr. Ott replaced Dr. Mills in the OR due to shift change. The cyst bed was irrigated and it and the abdomen were inspected for hemostasis. The surgical sites appeared stable without any bleeding edges.  Pictures were taken.  There  was no active bleeding seen at the end of the procedure.    A final inspection of the entire abdomen was performed.  The ports were removed under direct visualization.  The skin incisions were closed with 4-0 monocryl and dermabond.  The vaginal sponge stick and irizarry catheter were removed. Instrument, sponge, and needle counts were correct. Dr. Mills and Dr. Ott were present and scrubbed for the entire procedure. The patient was extubated in the operating room and transferred to the PACU in stable condition.      Elizabeth Coyle MD  Obstetrics & Gynecology, PGY-3  06/03/2025 7:26 AM     I participated in all aspects of Arbaella Lawson's case with Dr. Coyle on 6/3/2025 and agree with the operative details in this note with edits by me. Dr. Ott replaced me after the cystectomy for final inspection and closure of the surgery.    Robina Mills MD

## 2025-06-03 NOTE — ED TRIAGE NOTES
Pt here for L sided pelvic pain that started at 2230. Took Ibuprofen and  Norco with no relief, EMS gave 15 of Ketorolac. Pt has a hx of ovarian cyst, rheumatoid arthritis and depression.  Pt BIBA writhing in pain.  Pt states also having light spotting, has IUD. Denies urinary symptoms.

## 2025-06-03 NOTE — TELEPHONE ENCOUNTER
----- Message from Robina Mills sent at 6/3/2025  3:35 PM CDT -----  Regarding: Post-op appointment  Can you schedule Arianne for a post-op appointment with me. Next available DOD or regular clinic spot with me is ok.     Robina Mills MD

## 2025-06-03 NOTE — ANESTHESIA PREPROCEDURE EVALUATION
"Anesthesia Pre-Procedure Evaluation    Patient: Arabella Lawson   MRN: [id number] : 2000          Procedure : Procedure(s):  Laparoscopy diagnostic (gyn)  Exam under anesthesia pelvic  Laparoscopic oophorectomy  Laparoscopic cystectomy ovarian (oncology)         Past Medical History:   Diagnosis Date    Asthma     Bipolar depression (H)       Past Surgical History:   Procedure Laterality Date    TONSILLECTOMY        No Known Allergies   Social History     Tobacco Use    Smoking status: Never    Smokeless tobacco: Never   Substance Use Topics    Alcohol use: Never      Wt Readings from Last 1 Encounters:   25 77.1 kg (170 lb)        Anesthesia Evaluation            ROS/MED HX  ENT/Pulmonary:     (+)                     Mild Persistent, asthma                  Neurologic:  - neg neurologic ROS     Cardiovascular:  - neg cardiovascular ROS     METS/Exercise Tolerance:     Hematologic:  - neg hematologic  ROS     Musculoskeletal:       GI/Hepatic:       Renal/Genitourinary:  - neg Renal ROS     Endo:  - neg endo ROS     Psychiatric/Substance Use:  - neg psychiatric ROS     Infectious Disease:  - neg infectious disease ROS     Malignancy:       Other:              Physical Exam    OUTSIDE LABS:  CBC:   Lab Results   Component Value Date    WBC 19.1 (H) 2025    WBC 17.7 (H) 2025    HGB 12.5 2025    HGB 12.5 2025    HCT 37.2 2025    HCT 37.5 2025     2025     2025     BMP:   Lab Results   Component Value Date     2025     2025    POTASSIUM 3.4 2025    POTASSIUM 3.6 2025    CHLORIDE 101 2025    CHLORIDE 103 2025    CO2 23 2025    CO2 25 2025    BUN 11.0 2025    BUN 9.5 2025    CR 0.65 2025    CR 0.66 2025     (H) 2025     (H) 2025     COAGS: No results found for: \"PTT\", \"INR\", \"FIBR\"  POC:   Lab Results   Component Value Date    HCG " "Negative 05/05/2025    HCGS Negative 06/03/2025     HEPATIC:   Lab Results   Component Value Date    ALBUMIN 4.5 06/03/2025    PROTTOTAL 6.8 06/03/2025    ALT 19 06/03/2025    AST 21 06/03/2025    ALKPHOS 71 06/03/2025    BILITOTAL 0.2 06/03/2025     OTHER:   Lab Results   Component Value Date    CASSIDY 9.3 06/03/2025    MAG 2.5 (H) 07/15/2022    LIPASE 16 06/03/2025    TSH 1.98 07/15/2022       Anesthesia Plan    ASA Status:  2      NPO Status: NPO Appropriate   Anesthesia Type: General.  Airway: oral.  Induction: intravenous.  Maintenance: Balanced.   Techniques and Equipment:       - Monitoring Plan: standard ASA monitoring     Consents    Anesthesia Plan(s) and associated risks, benefits, and realistic alternatives discussed. Questions answered and patient/representative(s) expressed understanding.     - Discussed:     - Discussed with:  Patient               Postoperative Care    Pain management: non-narcotic analgesics, plan for postoperative opioid use, multimodal analgesia.     Comments:                   Carol Escobedo MD    I have reviewed the pertinent notes and labs in the chart from the past 30 days and (re)examined the patient.  Any updates or changes from those notes are reflected in this note.    Clinically Significant Risk Factors Present on Admission                             # Overweight: Estimated body mass index is 29.18 kg/m  as calculated from the following:    Height as of this encounter: 1.626 m (5' 4\").    Weight as of this encounter: 77.1 kg (170 lb).       # Asthma: noted on problem list              "

## 2025-06-03 NOTE — OR NURSING
RN notified surgeon via Vocera and [facility] page that patient requests to speak with someone from surgical team regarding how procedure went. Patient aware surgical team representative may be unavailable. Patient phone number provided to surgeon.

## 2025-06-12 ENCOUNTER — MYC MEDICAL ADVICE (OUTPATIENT)
Dept: OBGYN | Facility: CLINIC | Age: 25
End: 2025-06-12
Payer: COMMERCIAL

## 2025-06-18 ENCOUNTER — OFFICE VISIT (OUTPATIENT)
Dept: OBGYN | Facility: CLINIC | Age: 25
End: 2025-06-18
Attending: STUDENT IN AN ORGANIZED HEALTH CARE EDUCATION/TRAINING PROGRAM
Payer: COMMERCIAL

## 2025-06-18 VITALS
BODY MASS INDEX: 29.78 KG/M2 | HEIGHT: 64 IN | HEART RATE: 96 BPM | WEIGHT: 174.4 LBS | SYSTOLIC BLOOD PRESSURE: 121 MMHG | DIASTOLIC BLOOD PRESSURE: 81 MMHG

## 2025-06-18 DIAGNOSIS — N80.9 ENDOMETRIOSIS: ICD-10-CM

## 2025-06-18 DIAGNOSIS — Z87.42 HISTORY OF OVARIAN CYSTECTOMY: Primary | ICD-10-CM

## 2025-06-18 DIAGNOSIS — Z98.890 HISTORY OF OVARIAN CYSTECTOMY: Primary | ICD-10-CM

## 2025-06-18 LAB
PATH REPORT.COMMENTS IMP SPEC: NORMAL
PATH REPORT.COMMENTS IMP SPEC: NORMAL
PATH REPORT.FINAL DX SPEC: NORMAL
PATH REPORT.GROSS SPEC: NORMAL
PATH REPORT.MICROSCOPIC SPEC OTHER STN: NORMAL
PATH REPORT.RELEVANT HX SPEC: NORMAL
PHOTO IMAGE: NORMAL

## 2025-06-18 PROCEDURE — G0463 HOSPITAL OUTPT CLINIC VISIT: HCPCS | Performed by: STUDENT IN AN ORGANIZED HEALTH CARE EDUCATION/TRAINING PROGRAM

## 2025-06-18 NOTE — PATIENT INSTRUCTIONS
Thank you for trusting us with your care!   Please be aware, if you are on Mychart, you may see your results prior to your providers review. If labs are abnormal, we will call or message you on [employer] with a follow up plan.    If you need to contact us for questions about:  Symptoms, Scheduling & Medical Questions; Non-urgent (2-3 day response) ONE[employer] message, Urgent (needing response today) [phone] (if after 3:30pm next day response)   Prescriptions: Please call your Pharmacy   Billing: Louie [phone] or [facility]:[phone]

## 2025-06-18 NOTE — PROGRESS NOTES
"[facility]    Reason for visit: post-op visit    HPI: Arabella Lawson is a 24 year old  who presents to clinic today for a postoperative visit following a left ovarian cystectomy and detorsion of left ovarian torsion on 6/3/25.  Since surgery she has been doing well. Needed 2 oxy and then just tylenol and ibuprofen. Back to work, moving around ok. Mirena IUD in place. Having menses that are light, lasting a day or two with the IUD in place. Has also used OCPs in the past but menses were worse with those than with IUD.     Findings:   On laparoscopy, no signs of trauma on entry. Liver edge, stomach edge, bowel, appendix grossly normal appearing. There are filmy adhesions from the bowel to the left pelvic sidewall, and from the bowel to the right pelvic sidewall. There are several areas consistent with focal endometriosis implants, including a blue-black lesion in the right ovarian fossa and one on the right upper quadrant anterior abdominal wall. The left ovary is enlarged and purple appearing, twisted on the infundibulopelvic ligament two to three times. Left ovarian cyst incised and drained for clear fluid, cyst wall removed in entirety. There are remaining areas of the left ovary that appear consistent with a small endometrioma. Otherwise normal appearing uterus, bilateral fallopian tubes and right ovary. Right ureter visualized to vermiculate, left ureter obscured by adhesive disease.    Surg path in process    Objective:  /81   Pulse 96   Ht 1.626 m (5' 4.02\")   Wt 79.1 kg (174 lb 6.4 oz)   BMI 29.92 kg/m    General: Well-appearing  Abdomen: Laparoscopic sites are well healed, surrounding bruising present     Assessment and plan:  -Normal postoperative recovery  -Operative findings reviewed.  Operative pictures were given to the patient. Will send path via Skype  -May return to full activity including intercourse  -Follow-up as needed. Discussed endometriosis and that " IUD does not suppress cysts. She does have some endometriomas present. If recurrent painful ovarian cysts or large cysts putting her at risk for recurrent torsion would recommend ovarian suppression with combined hormonal contraceptive on top of IUD or depo-provera. Discussed follow-up ultrasound if symptoms.     Robina Mills MD

## 2025-06-18 NOTE — LETTER
"2025       RE: Arabella Lawson  [address]  [address]  [place] [address]     Dear Colleague,    Thank you for referring your patient, Arabella Lawson, to the [facility] [place] at [facility]. Please see a copy of my visit note below.    [facility]    Reason for visit: post-op visit    HPI: Arabella Lawson is a 24 year old  who presents to clinic today for a postoperative visit following a left ovarian cystectomy and detorsion of left ovarian torsion on 6/3/25.  Since surgery she has been doing well. Needed 2 oxy and then just tylenol and ibuprofen. Back to work, moving around ok. Mirena IUD in place. Having menses that are light, lasting a day or two with the IUD in place. Has also used OCPs in the past but menses were worse with those than with IUD.     Findings:   On laparoscopy, no signs of trauma on entry. Liver edge, stomach edge, bowel, appendix grossly normal appearing. There are filmy adhesions from the bowel to the left pelvic sidewall, and from the bowel to the right pelvic sidewall. There are several areas consistent with focal endometriosis implants, including a blue-black lesion in the right ovarian fossa and one on the right upper quadrant anterior abdominal wall. The left ovary is enlarged and purple appearing, twisted on the infundibulopelvic ligament two to three times. Left ovarian cyst incised and drained for clear fluid, cyst wall removed in entirety. There are remaining areas of the left ovary that appear consistent with a small endometrioma. Otherwise normal appearing uterus, bilateral fallopian tubes and right ovary. Right ureter visualized to vermiculate, left ureter obscured by adhesive disease.    Surg path in process    Objective:  /81   Pulse 96   Ht 1.626 m (5' 4.02\")   Wt 79.1 kg (174 lb 6.4 oz)   BMI 29.92 kg/m    General: Well-appearing  Abdomen: " Laparoscopic sites are well healed, surrounding bruising present     Assessment and plan:  -Normal postoperative recovery  -Operative findings reviewed.  Operative pictures were given to the patient. Will send path via [employer]  -May return to full activity including intercourse  -Follow-up as needed. Discussed endometriosis and that IUD does not suppress cysts. She does have some endometriomas present. If recurrent painful ovarian cysts or large cysts putting her at risk for recurrent torsion would recommend ovarian suppression with combined hormonal contraceptive on top of IUD or depo-provera. Discussed follow-up ultrasound if symptoms.     Robina Mills MD      Again, thank you for allowing me to participate in the care of your patient.      Sincerely,    Robina Mills MD

## 2025-06-20 ENCOUNTER — RESULTS FOLLOW-UP (OUTPATIENT)
Dept: OBGYN | Facility: CLINIC | Age: 25
End: 2025-06-20

## 2025-07-19 ENCOUNTER — HEALTH MAINTENANCE LETTER (OUTPATIENT)
Age: 25
End: 2025-07-19

## (undated) DEVICE — CATH TRAY FOLEY SURESTEP 16FR WDRAIN BAG STLK LATEX A300316A

## (undated) DEVICE — Device

## (undated) DEVICE — ESU HOLDER LAP INST DISP PURPLE LONG 330MM H-PRO-330

## (undated) DEVICE — GLOVE BIOGEL PI MICRO INDICATOR UNDERGLOVE SZ 6.5 48965

## (undated) DEVICE — DECANTER FLUID L3 IN TRANSFER STRL LF DYNJDEC03

## (undated) DEVICE — SOLUTION IRRIGATION 0.9% NACL 1000ML BOTTLE R5200-01

## (undated) DEVICE — LINEN TOWEL PACK X5 5464

## (undated) DEVICE — TUBING SMOKE EVAC PNEUMOCLEAR HIGH FLOW 0620050250

## (undated) DEVICE — UNDERPAD 36X30 PREMIERPRO MAX ABS NS LF 676111

## (undated) DEVICE — STRAP POSITIONING 60X31" BODY KNEE KBS 01

## (undated) DEVICE — ESU ENDO SCISSORS 5MM CVD 5DCS

## (undated) DEVICE — SU DERMABOND ADVANCED .7ML DNX12

## (undated) DEVICE — ENDO SCOPE WARMER LF TM500

## (undated) DEVICE — PAD PERI INDIV WRAP 11" NON241286

## (undated) DEVICE — SU MONOCRYL 4-0 PS-2 18" UND Y496G

## (undated) DEVICE — NDL INSUFFLATION 13GA 120MM C2201

## (undated) DEVICE — PANTIES MESH LG/XLG 2PK 706M2

## (undated) DEVICE — ESU GROUND PAD UNIVERSAL W/O CORD

## (undated) DEVICE — ENDO TROCAR SLEEVE KII Z-THREADED 05X100MM CTS02

## (undated) DEVICE — ENDO TROCAR FIRST ENTRY KII FIOS Z-THRD 05X100MM CTF03

## (undated) DEVICE — NDL ECLIPSE 25GA 1.5"

## (undated) DEVICE — LINEN GOWN X4 5410

## (undated) DEVICE — SUCTION MANIFOLD NEPTUNE 2 SYS 4 PORT 0702-020-000

## (undated) DEVICE — PREP DYNA-HEX 4% CHG SCRUB 4OZ BOTTLE MDS098710

## (undated) DEVICE — GLOVE BIOGEL PI ULTRATOUCH SZ 6.5 41165

## (undated) DEVICE — JELLY LUBRICATING SURGILUBE 2OZ TUBE 0281-0205-02

## (undated) RX ORDER — LIDOCAINE HYDROCHLORIDE AND EPINEPHRINE 10; 10 MG/ML; UG/ML
INJECTION, SOLUTION INFILTRATION; PERINEURAL
Status: DISPENSED
Start: 2024-08-08

## (undated) RX ORDER — PROPOFOL 10 MG/ML
INJECTION, EMULSION INTRAVENOUS
Status: DISPENSED
Start: 2025-06-03

## (undated) RX ORDER — FENTANYL CITRATE 50 UG/ML
INJECTION, SOLUTION INTRAMUSCULAR; INTRAVENOUS
Status: DISPENSED
Start: 2025-06-03

## (undated) RX ORDER — DEXAMETHASONE SODIUM PHOSPHATE 4 MG/ML
INJECTION, SOLUTION INTRA-ARTICULAR; INTRALESIONAL; INTRAMUSCULAR; INTRAVENOUS; SOFT TISSUE
Status: DISPENSED
Start: 2025-06-03

## (undated) RX ORDER — BUPIVACAINE HYDROCHLORIDE 2.5 MG/ML
INJECTION, SOLUTION EPIDURAL; INFILTRATION; INTRACAUDAL; PERINEURAL
Status: DISPENSED
Start: 2025-06-03

## (undated) RX ORDER — FENTANYL CITRATE-0.9 % NACL/PF 10 MCG/ML
PLASTIC BAG, INJECTION (ML) INTRAVENOUS
Status: DISPENSED
Start: 2025-06-03

## (undated) RX ORDER — ONDANSETRON 2 MG/ML
INJECTION INTRAMUSCULAR; INTRAVENOUS
Status: DISPENSED
Start: 2025-06-03

## (undated) RX ORDER — ACETAMINOPHEN 325 MG/1
TABLET ORAL
Status: DISPENSED
Start: 2025-06-03